# Patient Record
Sex: FEMALE | Race: WHITE
[De-identification: names, ages, dates, MRNs, and addresses within clinical notes are randomized per-mention and may not be internally consistent; named-entity substitution may affect disease eponyms.]

---

## 2018-04-02 ENCOUNTER — HOSPITAL ENCOUNTER (EMERGENCY)
Dept: HOSPITAL 68 - ERH | Age: 83
End: 2018-04-02
Payer: COMMERCIAL

## 2018-04-02 VITALS — HEIGHT: 61 IN | BODY MASS INDEX: 38.89 KG/M2 | WEIGHT: 206 LBS

## 2018-04-02 VITALS — DIASTOLIC BLOOD PRESSURE: 69 MMHG | SYSTOLIC BLOOD PRESSURE: 132 MMHG

## 2018-04-02 DIAGNOSIS — I10: ICD-10-CM

## 2018-04-02 DIAGNOSIS — E11.9: ICD-10-CM

## 2018-04-02 DIAGNOSIS — L03.115: Primary | ICD-10-CM

## 2018-04-02 DIAGNOSIS — R60.0: ICD-10-CM

## 2018-04-02 DIAGNOSIS — Z79.4: ICD-10-CM

## 2018-04-02 DIAGNOSIS — I50.9: ICD-10-CM

## 2018-04-02 LAB
ABSOLUTE GRANULOCYTE CT: 10.4 /CUMM (ref 1.4–6.5)
BASOPHILS # BLD: 0 /CUMM (ref 0–0.2)
BASOPHILS NFR BLD: 0.3 % (ref 0–2)
EOSINOPHIL # BLD: 0.2 /CUMM (ref 0–0.7)
EOSINOPHIL NFR BLD: 1.8 % (ref 0–5)
ERYTHROCYTE [DISTWIDTH] IN BLOOD BY AUTOMATED COUNT: 22.7 % (ref 11.5–14.5)
GRANULOCYTES NFR BLD: 83 % (ref 42.2–75.2)
HCT VFR BLD CALC: 30.8 % (ref 37–47)
LYMPHOCYTES # BLD: 1.1 /CUMM (ref 1.2–3.4)
MCH RBC QN AUTO: 24.7 PG (ref 27–31)
MCHC RBC AUTO-ENTMCNC: 32.7 G/DL (ref 33–37)
MCV RBC AUTO: 75.7 FL (ref 81–99)
MONOCYTES # BLD: 0.8 /CUMM (ref 0.1–0.6)
PLATELET # BLD: 319 /CUMM (ref 130–400)
PMV BLD AUTO: 7.1 FL (ref 7.4–10.4)
RED BLOOD CELL CT: 4.07 /CUMM (ref 4.2–5.4)
WBC # BLD AUTO: 12.5 /CUMM (ref 4.8–10.8)

## 2018-04-02 NOTE — RADIOLOGY REPORT
EXAMINATION:
CHEST 2 VIEWS
 
CLINICAL INFORMATION:
Edema. Crackles.
 
COMPARISON:
10/31/2017.
 
TECHNIQUE:
PA and lateral views of the chest were obtained.
 
FINDINGS:
The cardiac silhouette is enlarged, though stable. Intact midline sternal
wires are present. There is a stable degree of interstitial prominence
throughout both lungs. There are no discernible pleural effusions nor
pneumothoraces. There are no consolidations. The osseous structures are
stable.
 
IMPRESSION:
 
No consolidations.
 
Stable cardiomegaly with stable degree of mild vascular congestion.

## 2018-04-02 NOTE — ED GENERAL ADULT
History of Present Illness
 
General
Chief Complaint: Lower Extremity Problems
Stated Complaint: SENT BY  FOR POSSIBLE CELLULITIS
Source: patient, family, old records
Exam Limitations: no limitations
 
Vital Signs & Intake/Output
Vital Signs & Intake/Output
 Vital Signs
 
 
Date Time Temp Pulse Resp B/P B/P Pulse O2 O2 Flow FiO2
 
     Mean Ox Delivery Rate 
 
2050      97 Nasal  
 
       Cannula  
 
2048 97.9 96 18 132/69  97 Nasal 2.0L 
 
       Cannula  
 
1818 97.7 96 18 145/82  93 Room Air  
 
 
 
Allergies
Coded Allergies:
NO KNOWN ALLERGIES (13)
 
Reconcile Medications
Albuterol Sulfate 2.5 MG/3 ML (0.083 %) VIAL.NEB   1 Vial INH/SOL Q6-PRN PRN 
SHORTNESS OF BREATH  (Reported)
Amoxicillin/Potassium Clav (Augmentin 875-125 Tablet) 875 MG-125 MG TABLET   1 
TAB PO BID cellulitis 
Apixaban (Eliquis) 2.5 MG TABLET   2.5 MG PO BID ATRIAL FIBRILLATION
     .
Fluticasone/Salmeterol (Advair 250-50 Diskus) 250 MCG-50 MCG/DOSE BLST.W.DEV   1
PUF INH BID PRN SHORTNESS OF BREATH  (Reported)
Furosemide (Lasix) 40 MG TABLET   1 TAB PO DAILY WATER RETENTION  (Reported)
Gabapentin 300 MG CAPSULE   1 CAP PO QPM SLEEP  (Reported)
Insulin Detemir (Levemir Flextouch) 100 UNIT/ML (3 ML) INSULN.PEN   29 U SC 
DAILY DIABETES  (Reported)
Isosorbide Mononitrate (Isosorbide Mononitrate ER) 60 MG TAB.ER.24H   1 TAB PO 
DAILY HEART  (Reported)
Levothyroxine Sodium 100 MCG TABLET   1 TAB PO DAILY AC THYROID  (Reported)
Lorazepam 0.5 MG TABLET   1 TAB PO QPMP PRN SLEEP  (Reported)
Metoprolol Succinate 100 MG TAB.ER.24H   1 TAB PO DAILY HEART  (Reported)
Omeprazole 40 MG CAPSULE.DR   1 CAP PO QPM ACID REFLUX  (Reported)
Oxycodone HCl/Acetaminophen (Oxycodone-Acetaminophen 5-325) 5 MG-325 MG TABLET  
1 TAB PO BIDP PRN PAIN  (Reported)
Repaglinide (Prandin) 2 MG TABLET   1 TAB PO TID DIABETES
     .
Simvastatin (Simvastatin*) 40 MG TABLET   1 TAB PO DAILY CHOLESTEROL  (Reported)
 
Triage Note:
PT TO WITH DAUGHTER C/C RLE EDEMA AND REDDNESS X 1
DAY. +5 LB WEIGHT GAIN. DENIES CHEST PAIN. HX OF
SOB SECONDARY TO COPD, ON 2L NC AT BASELINE
Triage Nurses Notes Reviewed? yes
Onset: Abrupt
Duration: day(s): (2), constant, continues in ED
Timing: recent history
Injury Environment: home
Severity: mild, moderate
Severity Numbers: 5
No Modifying Factors: none
LMP (ages 10-50): post menopausal
Pregnant: No
Patient currently breastfeeds: No
HPI:
83-year-old female past medical history of COPD on 2 L home O2, CHF, atrial 
fibrillation, diabetes, hypothyroid, coronary artery disease presents for 
evaluation of pain swelling or redness in her right lower extremity as well as 
bilateral lower extremity edema right worse than left.  Patient reports she has 
gained 5 pounds over the past day.  She has a history of recurrent cellulitis in
the right lower extremity.  She reports that it is usually always a light pink 
color but over the past several days is become darker red.  There is also 
painful around the lower extremity calf and ankle.  No trauma.  She is able to 
walk with a walker.  No chest pain shortness of breath fever numbness tingling. 
No coughing no hemoptysis.  No recent surgery or trauma.  She has not required 
additional home oxygen.
(Rogelio Pandya)
 
Past History
 
Travel History
Traveled to [place] past 21 day No
 
Medical History
Any Pertinent Medical History? see below for history
Neurological: migraine (remote history), NEUROPATHY
EENT: NONE
Cardiovascular: AFIB, angina, CHF, hypertension, hyperlipidemia, "MULTIPLE MI'S
" QUAD BYPASS
Respiratory: COPD
Gastrointestinal: GERD
Hepatic: NONE
Renal: NONE
Musculoskeletal: NONE
Psychiatric: anxiety
Endocrine: diabetes, HYPOTHYROID
Blood Disorders: NONE
Cancer(s): NONE
History of MRSA: No
History of VRE: No
History of CDIFF: No
 
Surgical History
Surgical History: appendectomy, arthroscopy, CABG, cholecystectomy, hysterectomy
, thyroid lobectomy rotator cuff repair
 
Psychosocial History
Who do you live with Daughter
Services at Home None
What is your primary language English
Tobacco Use: Never used
 
Family History
Family History, If Any:
FATHER
  FH: CABG (coronary artery bypass surgery)
  FH: diabetes mellitus
  FH: HTN (hypertension)
  FH: kidney disease
  FH: stroke
MOTHER
  FH: myocardial infarction
BROTHER
  FH: heart disease
SISTER
  FH: COPD (chronic obstructive pulmonary disease)
  FH: heart disease
 
Hx Contributory? No
(Rogelio Pandya)
 
Review of Systems
 
Review of Systems
Constitutional:
Reports: no symptoms. 
EENTM:
Reports: no symptoms. 
Respiratory:
Reports: no symptoms. 
Cardiovascular:
Reports: peripheral edema. 
GI:
Reports: no symptoms. 
Genitourinary:
Reports: no symptoms. 
Musculoskeletal:
Reports: see HPI, joint pain, joint swelling, muscle pain, muscle stiffness. 
Skin:
Reports: erythema. 
Neurological/Psychological:
Reports: no symptoms. 
Hematologic/Endocrine:
Reports: no symptoms. 
Immunologic/Allergic:
Reports: no symptoms. 
All Other Systems: Reviewed and Negative
(Rogelio Pandya)
 
Physical Exam
 
Physical Exam
General Appearance: well developed/nourished, no apparent distress, alert, awake
, obese
Head: atraumatic, normal appearance
Eyes:
Bilateral: normal appearance, PERRL, EOMI. 
Ears, Nose, Throat: hearing grossly normal
Neck: normal inspection, supple, full range of motion, NO JVD
Respiratory: chest non-tender, no respiratory distress, quiet respiration, 
decreased breath sounds
Cardiovascular: regular rate/rhythm, normal peripheral pulses
Peripheral Pulses:
2+ radial (R), 2+ radial (L)
Gastrointestinal: soft, non-tender
Back: normal inspection, normal range of motion
Extremities: THERE IS BILATERAL 1+  LOWER EXTREMITY EDEMA RT WORSE THAN LEFT.  
tHERE IS ERYTHEMA PRESENT FROM THE RIGHT ANKLE EXTENDING TO THE MID RIGHT LOWER 
LEG.  tHIS AREA IS TENDER TO PALPATION AND WARM .  nO FOCAL FLUCTUANT AREAS OR 
DISCHARGE.  nO BRUISING.  sHE IS ABLE TO WALK AND BEAR WEIGHT.
Neurologic/Psych: no motor/sensory deficits, awake, alert, oriented x 3, normal 
gait (WITH WALKER )
Skin: intact, normal color, warm/dry
Lymphatic: no anterior cervical lucero
 
Core Measures
ACS in differential dx? No
CVA/TIA Diagnosis: No
Sepsis Present: No
Sepsis Focused Exam Completed? No
(Rogelio Pandya)
 
Progress
Differential Diagnoses
I considered the following diagnoses in my evaluation of the patient: [
Cellulitis, DVT, CHF exacerbation, COPD exacerbation, dependent edema, 
osteomyelitis, gout, osteoarthritis, sprain, fracture]
 
Plan of Care:
 Orders
 
 
Procedure Date/time Status
 
TROPONIN LEVEL 1819 Complete
 
LACTIC ACID 1819 Complete
 
COMPREHENSIVE METABOLIC PANEL 1819 Complete
 
CBC WITHOUT DIFFERENTIAL 1819 Complete
 
B-TYPE NATRIURETIC PEP (BNP) 1819 Complete
 
EKG 1819 Active
 
 
 Laboratory Tests
 
 
 
18:
Lactic Acid Cancelled
 
18 1843:
Anion Gap 12, Estimated GFR 33  L, BUN/Creatinine Ratio 20.7, Glucose 187  H, 
Lactic Acid 2.1, Calcium 8.8, Total Bilirubin 0.7, AST 15, ALT 19, Alkaline 
Phosphatase 93, Troponin I 0.03, Pro-B-Natriuretic Pept 1370  H, Total Protein 
7.0, Albumin 3.7, Globulin 3.3, Albumin/Globulin Ratio 1.1, CBC w Diff NO MAN 
DIFF REQ, RBC 4.07  L, MCV 75.7  L, MCH 24.7  L, MCHC 32.7  L, RDW 22.7  H, MPV 
7.1  L, Gran % 83.0  H, Lymphocytes % 8.9  L, Monocytes % 6.0, Eosinophils % 1.8
, Basophils % 0.3, Absolute Granulocytes 10.4  H, Absolute Lymphocytes 1.1  L, 
Absolute Monocytes 0.8  H, Absolute Eosinophils 0.2, Absolute Basophils 0
 
18:
Urine Color Cancelled, Urine Clarity Cancelled, Urine pH Cancelled, Ur Specific 
Gravity Cancelled, Urine Protein Cancelled, Urine Ketones Cancelled, Urine 
Nitrite Cancelled, Urine Bilirubin Cancelled, Urine Urobilinogen Cancelled, Ur 
Leukocyte Esterase Cancelled, Ur Microscopic Cancelled, Urine Hemoglobin 
Cancelled, Urine Glucose Cancelled
 
 
Patient seen and evaluated.  She is reporting pain swelling and redness in the 
right lower extremity.  She has a history of recurrent cellulitis in this area. 
She is afebrile no chest pain or shortness of breath.  She is satting in the mid
90s on her home O2.  Basic blood work chest x-ray ultrasound EKG obtained.
 
 white blood cell count of 12.5 thousand.  Negative lactic acid.  Remaining 
blood work is unchanged from previous.  Chest x-ray is negative for pulmonary 
edema.  Negative for DVT.  Trachea of the right ankle shows soft tissue swelling
without bony abnormality.  Negative troponin EKG shows A. fib rate controlled.  
Patient was ambulated in the emergency department and did not desaturate.  She 
has a steady gait with her walker.  She lives at home with her daughter and has 
a nurse that visits 3 times weekly.  Patient was medicated with Unasyn here.  
She'll be discharged home on Augmentin.  A line was made around the area of 
erythema to track spread.  Discussed return precautions in detail.  Follow-up 
with primary care doctor and cardiologist for a recheck in a few days return to 
the emergency department immediately if any concerns.  Patient agrees the plan. 
Case discussed with Dr. MORLEY he agrees.
Diagnostic Imaging:
Viewed by Me: Radiology Read.  Discussed w/RAD: Radiology Read. 
Radiology Impression: PATIENT: ENA THOMPSON  MEDICAL RECORD NO: [id number] PRESENT 
AGE: 83  PATIENT ACCOUNT NO: [id number] : 37  LOCATION: [facility] ORDERING 
PHYSICIAN: Rogelio IBARRA     SERVICE DATE:  EXAM TYPE: RAD - XRY-
ANKLE 3 OR MORE VIEWS R EXAMINATION: RIGHT ANKLE 3 VIEWS CLINICAL INFORMATION: 
Ankle pain and swelling. COMPARISON: None. TECHNIQUE: AP, lateral, oblique views
of the right ankle were obtained. FINDINGS: There are no fractures or 
dislocations. There is soft tissue swelling overlying the lateral malleolus. 
There is a small calcaneal spur at the Achilles insertion site. There is a well-
corticated ossicle inferior to the lateral malleolus. No ankle joint effusion is
identified. IMPRESSION: Soft tissue swelling without acute fracture. DICTATED BY
: James Rodriguez MD  DATE/TIME DICTATED:18 :RAD.MEZA
 DATE/TIME TRANSCRIBED:18 CONFIDENTIAL, DO NOT COPY WITHOUT 
APPROPRIATE AUTHORIZATION.  <Electronically signed in Other Vendor System>      
                                                                                
SIGNED BY: James Rodriguez MD 18 2
CXR Impression: PATIENT: ENA THOMPSON  MEDICAL RECORD NO: [id number] PRESENT AGE: 
83  PATIENT ACCOUNT NO: [id number] : 37  LOCATION: [facility] ORDERING PHYSICIAN:
Rogelio IBARRA     SERVICE DATE:  EXAM TYPE: RAD - XRY-CHEST XRAY, 
TWO VIEWS EXAMINATION: CHEST 2 VIEWS CLINICAL INFORMATION: Edema. Crackles. 
COMPARISON: 10/31/2017. TECHNIQUE: PA and lateral views of the chest were 
obtained. FINDINGS: The cardiac silhouette is enlarged, though stable. Intact 
midline sternal wires are present. There is a stable degree of interstitial 
prominence throughout both lungs. There are no discernible pleural effusions nor
pneumothoraces. There are no consolidations. The osseous structures are stable. 
IMPRESSION: No consolidations. Stable cardiomegaly with stable degree of mild 
vascular congestion. DICTATED BY: James Rodriguez MD  DATE/TIME DICTATED:18 :RAD.MEZA  DATE/TIME TRANSCRIBED:18 
CONFIDENTIAL, DO NOT COPY WITHOUT APPROPRIATE AUTHORIZATION.  <Electronically 
signed in Other Vendor System>                                                  
                                     SIGNED BY: James Rodriguez MD 18
Initial ED EKG: AFIB (RATE 117)
(Rogelio Pandya)
 
Departure
 
Departure
Disposition: HOME OR SELF CARE
Condition: Stable
Clinical Impression
Primary Impression: Cellulitis
Qualifiers:  Site of cellulitis: extremity Site of cellulitis of extremity: 
lower extremity Laterality: right Qualified Code: L03.115 - Cellulitis of right 
lower limb
Referrals:
Rell CALDERON,Marty QUEEN (PCP/Family)
 
Additional Instructions:
Take antibiotics as directed for the full course.  Continue all medications as 
directed.  Keep the legs elevated at night follow-up with her primary care 
doctor and cardiologist.  Monitor symptoms.  If you have worsening swelling, 
chest pain, shortness of breath, fever, spreading redness or any other concerns 
return immediately.
Departure Forms:
Customer Survey
General Discharge Information
Prescriptions:
Current Visit Scripts
Amoxicillin/Potassium Clav (Augmentin 875-125 Tablet) 1 TAB PO BID  
     #20 TAB 
 
 
(Rogelio Pandya)
 
PA/NP Co-Sign Statement
Statement:
ED Attending supervision documentation-
 
[X] I saw and evaluated the patient. I have also reviewed all the pertinent lab 
results and diagnostic results. I agree with the findings and the plan of care 
as documented in the PA's/NP's documentation. 
 
[X] I have reviewed the ED Record and agree with the PA's/NP's documentation.
 
[] Additions or exceptions (if any) to the PAs/NP's note and plan are 
summarized below:
[]
 
(Michelle CALDERON,Sathish MEJIA)
 
Critical Care Note
 
Critical Care Note
Critical Care Time: non-applicable
(Black PA,Rogelio)

## 2018-04-02 NOTE — RADIOLOGY REPORT
EXAMINATION:
RIGHT ANKLE 3 VIEWS
 
CLINICAL INFORMATION:
Ankle pain and swelling.
 
COMPARISON:
None.
 
TECHNIQUE:
AP, lateral, oblique views of the right ankle were obtained.
 
FINDINGS:
There are no fractures or dislocations. There is soft tissue swelling
overlying the lateral malleolus. There is a small calcaneal spur at the
Achilles insertion site. There is a well-corticated ossicle inferior to the
lateral malleolus. No ankle joint effusion is identified.
 
IMPRESSION:
 
Soft tissue swelling without acute fracture.

## 2018-04-02 NOTE — ULTRASOUND REPORT
EXAMINATION:
US TRIPLEX LOWER EXTREMITY, RIGHT
 
CLINICAL INFORMATION:
Right lower extremity pain and edema.
 
COMPARISON:
None
 
TECHNIQUE:
Color-flow triplex imaging with spectral analysis and compression Doppler
were performed on the lower extremity.
 
FINDINGS:
Respiratory variation, normal compression and augmented flow are noted
throughout the lower extremity. The visualized common femoral vein,
superficial femoral vein, profunda femoral vein, popliteal vein and midcalf
peroneal and posterior tibial venous segments show no evidence of deep venous
thrombosis.
 
There is no Baker's cyst.
 
IMPRESSION:
Normal triplex scan without evidence of deep venous thrombosis involving the
lower extremity.

## 2018-04-06 ENCOUNTER — HOSPITAL ENCOUNTER (INPATIENT)
Dept: HOSPITAL 68 - ERH | Age: 83
LOS: 4 days | Discharge: HOME HEALTH | DRG: 291 | End: 2018-04-10
Attending: INTERNAL MEDICINE | Admitting: INTERNAL MEDICINE
Payer: COMMERCIAL

## 2018-04-06 VITALS — WEIGHT: 187.25 LBS | BODY MASS INDEX: 35.35 KG/M2 | HEIGHT: 61 IN

## 2018-04-06 VITALS — SYSTOLIC BLOOD PRESSURE: 138 MMHG | DIASTOLIC BLOOD PRESSURE: 70 MMHG

## 2018-04-06 DIAGNOSIS — Z95.5: ICD-10-CM

## 2018-04-06 DIAGNOSIS — Z82.49: ICD-10-CM

## 2018-04-06 DIAGNOSIS — I25.10: ICD-10-CM

## 2018-04-06 DIAGNOSIS — I87.2: ICD-10-CM

## 2018-04-06 DIAGNOSIS — I13.0: Primary | ICD-10-CM

## 2018-04-06 DIAGNOSIS — L03.115: ICD-10-CM

## 2018-04-06 DIAGNOSIS — Z85.828: ICD-10-CM

## 2018-04-06 DIAGNOSIS — E66.01: ICD-10-CM

## 2018-04-06 DIAGNOSIS — K21.9: ICD-10-CM

## 2018-04-06 DIAGNOSIS — Z99.81: ICD-10-CM

## 2018-04-06 DIAGNOSIS — Z95.1: ICD-10-CM

## 2018-04-06 DIAGNOSIS — I48.2: ICD-10-CM

## 2018-04-06 DIAGNOSIS — Z79.4: ICD-10-CM

## 2018-04-06 DIAGNOSIS — I73.9: ICD-10-CM

## 2018-04-06 DIAGNOSIS — J44.9: ICD-10-CM

## 2018-04-06 DIAGNOSIS — R09.02: ICD-10-CM

## 2018-04-06 DIAGNOSIS — Z90.710: ICD-10-CM

## 2018-04-06 DIAGNOSIS — F41.9: ICD-10-CM

## 2018-04-06 DIAGNOSIS — E11.40: ICD-10-CM

## 2018-04-06 DIAGNOSIS — G47.33: ICD-10-CM

## 2018-04-06 DIAGNOSIS — Z79.01: ICD-10-CM

## 2018-04-06 DIAGNOSIS — I50.33: ICD-10-CM

## 2018-04-06 DIAGNOSIS — Z79.891: ICD-10-CM

## 2018-04-06 DIAGNOSIS — E89.0: ICD-10-CM

## 2018-04-06 DIAGNOSIS — Z90.49: ICD-10-CM

## 2018-04-06 DIAGNOSIS — G43.909: ICD-10-CM

## 2018-04-06 DIAGNOSIS — N18.9: ICD-10-CM

## 2018-04-06 DIAGNOSIS — E78.5: ICD-10-CM

## 2018-04-06 DIAGNOSIS — Z79.51: ICD-10-CM

## 2018-04-06 LAB
ABSOLUTE GRANULOCYTE CT: 9.6 /CUMM (ref 1.4–6.5)
BASOPHILS # BLD: 0 /CUMM (ref 0–0.2)
BASOPHILS NFR BLD: 0.1 % (ref 0–2)
EOSINOPHIL # BLD: 0.2 /CUMM (ref 0–0.7)
EOSINOPHIL NFR BLD: 1.3 % (ref 0–5)
ERYTHROCYTE [DISTWIDTH] IN BLOOD BY AUTOMATED COUNT: 23.5 % (ref 11.5–14.5)
GRANULOCYTES NFR BLD: 84 % (ref 42.2–75.2)
HCT VFR BLD CALC: 29.5 % (ref 37–47)
LYMPHOCYTES # BLD: 1 /CUMM (ref 1.2–3.4)
MCH RBC QN AUTO: 23.4 PG (ref 27–31)
MCHC RBC AUTO-ENTMCNC: 30.7 G/DL (ref 33–37)
MCV RBC AUTO: 76.4 FL (ref 81–99)
MONOCYTES # BLD: 0.7 /CUMM (ref 0.1–0.6)
PLATELET # BLD: 283 /CUMM (ref 130–400)
PMV BLD AUTO: 6.8 FL (ref 7.4–10.4)
RED BLOOD CELL CT: 3.86 /CUMM (ref 4.2–5.4)
WBC # BLD AUTO: 11.4 /CUMM (ref 4.8–10.8)

## 2018-04-06 NOTE — RADIOLOGY REPORT
EXAMINATION:
XR CHEST
 
CLINICAL INFORMATION:
Increased fluid retention. Shortness of breath.
 
COMPARISON:
04/02/2018
 
TECHNIQUE:
2 views of the chest were obtained.
 
FINDINGS:
Surgical clips overlie the mediastinum. Median sternotomy wires appear
intact. The lungs are well expanded. Central vascular prominence with mild
interstitial prominence. No pleural effusion or pneumothorax. No dense
consolidation. The cardiac silhouette is prominent. There is a calcified
aorta.
 
IMPRESSION:
Findings suggestive of mild fluid overload. No pleural effusion or
consolidation.

## 2018-04-06 NOTE — ED GENERAL ADULT
History of Present Illness
 
General
Chief Complaint: General Adult
Stated Complaint: "BACK PAIN, GAIN 9LBS OF FLUID IN 1 WEEK"
Source: patient, family
Exam Limitations: no limitations
 
Vital Signs & Intake/Output
Vital Signs & Intake/Output
 Vital Signs
 
 
Date Time Temp Pulse Resp B/P B/P Pulse O2 O2 Flow FiO2
 
     Mean Ox Delivery Rate 
 
04/06 2132       Nasal 3.0L 
 
       Cannula  
 
04/06 1906  101 20 114/64  96 Nasal 2.0L 
 
       Cannula  
 
04/06 1730 97.1 106 22 124/71  95 Nasal 2.0L 
 
       Cannula  
 
 
 
Allergies
Coded Allergies:
NO KNOWN ALLERGIES (11/26/13)
 
Reconcile Medications
Albuterol Sulfate 2.5 MG/3 ML (0.083 %) VIAL.NEB   1 Vial INH/SOL Q6-PRN PRN 
SHORTNESS OF BREATH  (Reported)
Amoxicillin/Potassium Clav (Augmentin 875-125 Tablet) 875 MG-125 MG TABLET   1 
TAB PO BID cellulitis 
Apixaban (Eliquis) 2.5 MG TABLET   2.5 MG PO BID ATRIAL FIBRILLATION
     .
Fluticasone/Salmeterol (Advair 250-50 Diskus) 250 MCG-50 MCG/DOSE BLST.W.DEV   1
PUF INH BID PRN SHORTNESS OF BREATH  (Reported)
Furosemide (Lasix) 40 MG TABLET   1 TAB PO DAILY WATER RETENTION  (Reported)
Gabapentin 300 MG CAPSULE   1 CAP PO QPM SLEEP  (Reported)
Insulin Detemir (Levemir Flextouch) 100 UNIT/ML (3 ML) INSULN.PEN   29 U SC 
DAILY DIABETES  (Reported)
Isosorbide Mononitrate (Isosorbide Mononitrate ER) 60 MG TAB.ER.24H   1 TAB PO 
DAILY HEART  (Reported)
Levothyroxine Sodium 100 MCG TABLET   1 TAB PO DAILY AC THYROID  (Reported)
Lorazepam 0.5 MG TABLET   1 TAB PO QPMP PRN SLEEP  (Reported)
Metoprolol Succinate 100 MG TAB.ER.24H   1 TAB PO DAILY HEART  (Reported)
Omeprazole 40 MG CAPSULE.DR   1 CAP PO QPM ACID REFLUX  (Reported)
Oxycodone HCl/Acetaminophen (Oxycodone-Acetaminophen 5-325) 5 MG-325 MG TABLET  
1 TAB PO BIDP PRN PAIN  (Reported)
Repaglinide (Prandin) 2 MG TABLET   1 TAB PO TID DIABETES
     .
Simvastatin (Simvastatin*) 40 MG TABLET   1 TAB PO DAILY CHOLESTEROL  (Reported)
 
Triage Note:
83 YEAR OLD FEMALE HISTORY OF CHF COMES TO ER DUE
 TO 5-10 LB WEIGHT GAIN SINCE MONDAY, PT IS O2
 DEPENDENT 2L VIA NC AND O2 SAT 95 %, DENIES
 INCREASED SOB. PT IS BEING TREATED WITH PO ABT FOR
 RLE CELLULITIS AND STATES THAT IT LOOKS BETTER. PT
 NOTED BLE EDEMA, ALSO COMPLAINS OF 10/10 BACK PAIN
 WHICH IS CHRONIC AND HER PMD INCREASED HER
 PERCOCET DOSE TODAY. PT TAKES 40 MG PO LASIX BID
 AND IT IS NOT HELPING
Triage Nurses Notes Reviewed? yes
Onset: Gradual
Duration: day(s):
Timing: recent history
Injury Environment: home
Severity: moderate
Modifying Factors:
Improves With: rest. 
Associated Symptoms: swelling of lower extremities, weight gain
HPI:
82 yo woman
presents with 3 weeks of increasing lower extremity swelling, 9 lb weight gain 
over the past week, diminished urination.
 
She was seen 5 days ago in ED for cellulitis which has improved, "but both her 
legs are still swollen."
 
Her PMD recently adjusted her lasix.
 
She is otherwise well. 
 
Past History
 
Travel History
Traveled to [place] past 21 day No
 
Medical History
Any Pertinent Medical History? see below for history
Neurological: migraine (remote history), NEUROPATHY
EENT: NONE
Cardiovascular: AFIB, angina, CHF, hypertension, hyperlipidemia, "MULTIPLE MI'S
" QUAD BYPASS
Respiratory: COPD
Gastrointestinal: GERD
Hepatic: NONE
Renal: NONE
Musculoskeletal: NONE
Psychiatric: anxiety
Endocrine: diabetes, HYPOTHYROID
Blood Disorders: NONE
Cancer(s): NONE
History of MRSA: No
History of VRE: No
History of CDIFF: No
 
Surgical History
Surgical History: appendectomy, arthroscopy, CABG, cholecystectomy, hysterectomy
, thyroid lobectomy rotator cuff repair
 
Psychosocial History
Who do you live with Daughter
Services at Home None
What is your primary language English
Tobacco Use: Never used
ETOH Use: denies use
Illicit Drug Use: denies illicit drug use
 
Family History
Family History, If Any:
FATHER
  FH: CABG (coronary artery bypass surgery)
  FH: diabetes mellitus
  FH: HTN (hypertension)
  FH: kidney disease
  FH: stroke
MOTHER
  FH: myocardial infarction
BROTHER
  FH: heart disease
SISTER
  FH: COPD (chronic obstructive pulmonary disease)
  FH: heart disease
 
Hx Contributory? No
 
Review of Systems
 
Review of Systems
Constitutional:
Reports: no symptoms. 
EENTM:
Reports: no symptoms. 
Respiratory:
Reports: no symptoms. 
Cardiovascular:
Reports: no symptoms. 
GI:
Reports: no symptoms. 
Genitourinary:
Reports: no symptoms. 
Musculoskeletal:
Reports: no symptoms. 
Skin:
Reports: no symptoms. 
Neurological/Psychological:
Reports: no symptoms. 
Hematologic/Endocrine:
Reports: no symptoms. 
Immunologic/Allergic:
Reports: no symptoms. 
All Other Systems: Reviewed and Negative
 
Physical Exam
 
Physical Exam
General Appearance: well developed/nourished, mild distress
Head: atraumatic, normal appearance
Eyes:
Bilateral: normal appearance. 
Ears, Nose, Throat: normal pharynx, normal ENT inspection
Neck: normal inspection, supple, full range of motion
Respiratory: normal breath sounds, chest non-tender, diminished breath sounds at
bases
Cardiovascular: regular rate/rhythm
Gastrointestinal: normal bowel sounds, soft, non-tender
Back: normal inspection, normal range of motion
Extremities: normal inspection, normal capillary refill, normal range of motion,
no edema
Neurologic/Psych: no motor/sensory deficits, awake, alert, oriented x 3
Skin: intact, normal color
 
Core Measures
ACS in differential dx? No
CVA/TIA Diagnosis: No
Sepsis Present: No
Sepsis Focused Exam Completed? No
 
Progress
Differential Diagnoses
I considered the following diagnoses in my evaluation of the patient: 
chf vs pneumonia vs other. 
 
Plan of Care:
 Orders
 
 
Procedure Date/time Status
 
Nothing by Mouth 04/07 B Active
 
Lab Add-on Test 04/06 2214 Active
 
Patient Data 04/06 2159 Active
 
Saline Lock 04/06 2134 Active
 
Misc Message 04/06 2134 Active
 
ED Holding Orders 04/06 2134 Active
 
Admit to inpatient 04/06 2134 Active
 
Vital Signs 04/06 2134 Active
 
Code Status 04/06 2134 Active
 
EKG 04/06 2006 Active
 
TSH REFLEX 04/06 1740 Active
 
B-TYPE NATRIURETIC PEP (BNP) 04/06 1740 Active
 
TROPONIN LEVEL 04/06 1735 Active
 
COMPREHENSIVE METABOLIC PANEL 04/06 1735 Active
 
CBC WITHOUT DIFFERENTIAL 04/06 1735 Complete
 
 
 Laboratory Tests
 
 
 
04/06/18 1740:
Anion Gap 13, Estimated GFR 39  L, BUN/Creatinine Ratio 21.5, Glucose 246  H, 
Calcium 8.7, Total Bilirubin 0.7, AST 17, ALT 22, Alkaline Phosphatase 91, 
Troponin I 0.02, Pro-B-Natriuretic Pept Pending, Total Protein 6.7, Albumin 3.5,
Globulin 3.2, Albumin/Globulin Ratio 1.1, TSH &T3 &Free T4 Intrp Pending, CBC w 
Diff NO MAN DIFF REQ, RBC 3.86  L, MCV 76.4  L, MCH 23.4  L, MCHC 30.7  L, RDW 
23.5  H, MPV 6.8  L, Gran % 84.0  H, Lymphocytes % 8.7  L, Monocytes % 5.9, 
Eosinophils % 1.3, Basophils % 0.1, Absolute Granulocytes 9.6  H, Absolute 
Lymphocytes 1.0  L, Absolute Monocytes 0.7  H, Absolute Eosinophils 0.2, 
Absolute Basophils 0
 
Initial ED EKG: AFIB, no acute changes
 
Departure
 
Departure
Disposition: HOME OR SELF CARE
Condition: Stable
Clinical Impression
Primary Impression: CHF (congestive heart failure)
Referrals:
Rell CALDERON,Marty QUEEN (PCP/Family)
 
Departure Forms:
Customer Survey
General Discharge Information
 
Admission Note
Spoke With:
Alice Hsu MD
Documentation of Exam:
Documentation of any treatments & extenuating circumstances including Concerns 
Regarding Discharge (functional status, medication knowledge or non-compliance, 
living conditions, etc.) that warrant an admission rather than observation: 
 
pt with chf w/ dyspnea and increased 02 requirement (2l nc to 3l nc).... with 9 
lb weight gain over the past week.... She merits iv last, 02 support, cards 
eval.
 
call placed to cards. 
 
 
Critical Care Note
 
Critical Care Note
Critical Care Time: non-applicable

## 2018-04-06 NOTE — HISTORY & PHYSICAL
Awadalla MD,Kathleen 04/06/18 2200:
General Information and [facility]
MD Statement:
I have seen and personally examined ENA THOMPSON and documented this H&P.
 
The patient is a 83 year old F who presented with a patient stated chief 
complaint of [Weight gain due to fluid overload- CHF exacerbation].
 
Source of Information: patient, old records
Exam Limitations: no limitations
History of Present Illness:
82-year-old woman with past history significant for coronary artery disease 
status post CABG in 2001 and stent placement in 2013, hypertension, A. fib on 
Xarelto, COPD on 2 L of home oxygen history of skin cancer brought in by by 
daughter for evaluation of weight gain of 9 pounds since last Monday.  Patient 
mentioned that she weigh herself daily and she noticed that she gained 5 pounds 
from Easter Sunday to Monday in the morning in addition to total gain of 4 
pounds over the past 4 days.  She also noticed a decreased urination.  In 
addition she complains of shortness of breath when she walks from her recliner 
to the bathroom, she had to increase her nasal oxygen from 2 L to 3 L to 
overcome this shortness of breath.  The patient noticed that her legs are more 
swollen than usual especially at the right ankle in addition to increased 
abdominal girth.  The patient is compliant with her home meds including 
furosemide however she has to get her BEP checked regularly for PJ and to dose 
she also complains of occasional dry cough.  She she cannot lie flat at baseline
because she feels short of breath versus orthopnea, she denies paroxysmal 
nocturnal dyspnea.  She also denies any fever, nausea, vomiting, diarrhea or 
constipation
Patient complains of chronic back pain for which she has to take Percocet 1 tab 
every 6 as needed, it was increased today by her PCP to 2 tabs every 6 as 
needed.
She lives at home with her daughter and granddaughter, uses walker for 
ambulation, denies any recent dietary changes or changes to her medications.  
She also denies recent travel or sick contacts.
In addition the patient reports that her blood sugar was not well controlled 
this morning her fingerstick glucose was 141.  She also complains of obstructive
sleep apnea for which she uses CPAP at night.
 
She was admitted back in August 2017 for CHF exacerbation, UTI, A. fib, DM
Her last echo in 8/17 showed ejection fraction more than 55%, left ventricular 
mild wall thickness, mild reduction of right ventricular systolic function, 
right ventricular systolic pressure was more than 60 mmHg
 
ED course:
Vital signs on admission: Blood pressure 124/71, temperature 97.1, pulse 106, 
respiratory 22, pulse ox 95 on 2 L nasal cannula
Labs on admission: WBC 11.4, hemoglobin 9.1, hematocrit 29.5, platelet 283, 
sodium 136, chloride 93, BUN 28, creatinine 1.3, glucose 246, AST 17, ALT 22,
Chest x-ray showed mild fluid overload, no pleural effusion or consolidation
 
 
Allergies/Medications
Allergies:
Coded Allergies:
NO KNOWN ALLERGIES (11/26/13)
 
Home Med list
Albuterol Sulfate 2.5 MG/3 ML (0.083 %) VIAL.NEB   1 Vial INH/SOL Q6-PRN PRN 
SHORTNESS OF BREATH  (Reported)
Amoxicillin/Potassium Clav (Augmentin 875-125 Tablet) 875 MG-125 MG TABLET   1 
TAB PO BID cellulitis 
Apixaban (Eliquis) 2.5 MG TABLET   2.5 MG PO BID ATRIAL FIBRILLATION
     .
Fluticasone/Salmeterol (Advair 250-50 Diskus) 250 MCG-50 MCG/DOSE BLST.W.DEV   1
PUF INH BID PRN SHORTNESS OF BREATH  (Reported)
Furosemide (Lasix) 40 MG TABLET   1 TAB PO DAILY WATER RETENTION  (Reported)
Gabapentin 300 MG CAPSULE   1 CAP PO QPM SLEEP  (Reported)
Insulin Detemir (Levemir Flextouch) 100 UNIT/ML (3 ML) INSULN.PEN   34 U SC 
DAILY DIABETES  (Reported)
Isosorbide Mononitrate (Isosorbide Mononitrate ER) 60 MG TAB.ER.24H   1 TAB PO 
DAILY HEART  (Reported)
Levothyroxine Sodium 100 MCG TABLET   1 TAB PO DAILY AC THYROID  (Reported)
Lorazepam 0.5 MG TABLET   1 TAB PO QPMP PRN SLEEP  (Reported)
Metoprolol Succinate 100 MG TAB.ER.24H   1 TAB PO DAILY HEART  (Reported)
Omeprazole 40 MG CAPSULE.DR   1 CAP PO QPM ACID REFLUX  (Reported)
Oxycodone HCl/Acetaminophen (Oxycodone-Acetaminophen 5-325) 5 MG-325 MG TABLET  
1 TAB PO BIDP PRN PAIN  (Reported)
Repaglinide (Prandin) 2 MG TABLET   1 TAB PO TID DIABETES
     .
Simvastatin (Simvastatin*) 40 MG TABLET   1 TAB PO DAILY CHOLESTEROL  (Reported)
 
 
Past History
 
Travel History
Traveled to [place] past 21 day No
 
Medical History
Neurological: migraine (remote history), NEUROPATHY
EENT: NONE
Cardiovascular: AFIB, angina, CHF, hypertension, hyperlipidemia, "MULTIPLE MI'S
" QUAD BYPASS
Respiratory: COPD
Gastrointestinal: GERD
Hepatic: NONE
Renal: NONE
Musculoskeletal: NONE
Psychiatric: anxiety
Endocrine: diabetes, HYPOTHYROID
Blood Disorders: NONE
Cancer(s): NONE
History of MRSA: No
History of VRE: No
History of CDIFF: No
 
Surgical History
Surgical History: appendectomy, arthroscopy, CABG, cholecystectomy, hysterectomy
, thyroid lobectomy rotator cuff repair
 
Past Family/Social History
 
Family History
Relations & Conditions if any
FATHER
  FH: CABG (coronary artery bypass surgery)
  FH: diabetes mellitus
  FH: HTN (hypertension)
  FH: kidney disease
  FH: stroke
MOTHER
  FH: myocardial infarction
BROTHER
  FH: heart disease
SISTER
  FH: COPD (chronic obstructive pulmonary disease)
  FH: heart disease
 
 
Psychosocial History
Services at Home: None
ETOH Use: denies use
Illicit Drug Use: denies illicit drug use
 
Review of Systems
 
Review of Systems
Constitutional:
Denies: no symptoms. 
Cardiovascular:
Reports: edema, orthopena.  Denies: palpitations, peripheral edema. 
Respiratory:
Reports: cough, orthopnea, short of breath.  Denies: sputum production, stridor,
wheezing. 
GI:
Denies: no symptoms. 
Genitourinary:
Denies: no symptoms. 
Musculoskeletal:
Denies: no symptoms. 
Skin:
Denies: no symptoms. 
Neurological/Psychological:
Denies: no symptoms. 
Hematologic/Endocrine:
Denies: no symptoms. 
 
Exam & Diagnostic Data
Last 24 Hrs of Vital Signs/I&O
 Vital Signs
 
 
Date Time Temp Pulse Resp B/P B/P Pulse O2 O2 Flow FiO2
 
     Mean Ox Delivery Rate 
 
04/06 2350      93 Nasal 2.0L 
 
       Cannula  
 
04/06 2336 98.3 93 18 138/70  95 Nasal 2.0L 
 
       Cannula  
 
04/06 2222 97.8 98 20 130/72  94 Nasal 2.0L 
 
       Cannula  
 
04/06 2132       Nasal 3.0L 
 
       Cannula  
 
04/06 1906  101 20 114/64  96 Nasal 2.0L 
 
       Cannula  
 
04/06 1730 97.1 106 22 124/71  95 Nasal 2.0L 
 
       Cannula  
 
 
 Intake & Output
 
 
 04/07 0800 04/07 0000 04/06 1600
 
Intake Total   
 
Output Total   
 
Balance   
 
    
 
Patient  208 lb 
 
Weight   
 
Weight  Reported by Patient 
 
Measurement   
 
Method   
 
 
 
 
Physical Exam
General Appearance Alert, Oriented X3, Cooperative, No Acute Distress
HEENT Atraumatic, PERRLA, EOMI, Mucous Membr. moist/pink
Neck Supple
Cardiovascular Normal S1, Normal S2
Lungs Normal Air Movement, minimal widespreas wheezes
Abdomen Normal Bowel Sounds, Soft, abd distension
Neurological Normal Speech, Strength at 5/5 X4 Ext, Normal Tone, Sensation 
Intact, Cranial Nerves 3-12 NL
Extremities bilateral 2 + pitting edema, right LE erythema is receeding from the
line marking done in the ED previously to monitor cellulitis
Vascular Normal Pulses
 
Assessment/Plan
Assessment:
82-year-old woman with past history significant for coronary artery disease 
status post CABG in 2001 and stent placement in 2013, hypertension, A. fib on 
Eliquis, COPD on 2 L of home oxygen history of skin cancer brought in by by 
daughter for evaluation of weight gain of 9 pounds since last Monday.  Most 
likely her symptoms is due to CHF exacerbation.  Given her CKD the patient might
not be receiving the optimal dose of Lasix.  In addition she was recently 
diagnosed with cellulitis which it to her right lower extremity swelling.  The 
patient is currently on Eliquis which decreased her risk of having DVT
 
Problem list:
CHF exacerbation
Diabetes mellitus
Hypertension
A. fib on Eliquis
COPD and oxygen
CKD
Chronic back pain
Right lower extremity cellulitis
 
Plan:
Admit to telemetry floor
IV Lasix 40 mg daily
Close monitoring of BEP
Atorvastatin 40 mg daily
Continue her metoprolol dose in the morning and restart
Cardiology consult appreciated in the a.m.
Fingerstick glucose
Levemir 12 units twice daily
Continue ELiquis 2.5 mg twice daily
Strict I's and O's
Vitals every shift
Augmentin 500 mg p.o. twice daily for a total of 10 days
Percocet 1 tab every 6 as needed for back pain
Continue home meds
 
Full code
DVT prophylaxis with Eliquis
Heart healthy diet
 
 
 
 
 
 
 
 
 
As Ranked By This Provider
Problem List:
 1. Cellulitis
 
 2. CHF (congestive heart failure)
 
 3. Diabetes
 
 4. Afib
 
 
Core Measures/Misc (9/17)
 
Acute Coronary Syndrome
ACS Diagnosis: No
 
Congestive Heart Failure
Congestive Heart Failure Diagnosis Yes
Last Known EF % 55
 
Cerebrovascular Accident
CVA/TIA Diagnosis: No
 
VTE (View Protocol)
VTE Risk Factors Age>40
No Mechanical VTE Prophylaxis d/t N/A MechProphylax Ordered
No VTE Pharm Prophylaxis d/t NA PharmProphylax ordered
 
Sepsis (View protocol)
Sepsis Present: No
 
 
Shadi,Aartee 04/07/18 0520:
Attending MD Review Statement
 
Attending Statement
Attending MD Statement: examined this patient, discuss w/resident/PA/NP, agreed 
w/resident/PA/NP, reviewed EMR data (avail), reviewed images, amended to note
Attending Assessment/Plan:
 
CC: Weight gain
PMH: A. fib, COPD on 2 L nasal cannula, heart failure with preserved ejection 
fraction, CAD S/P CABG and PCI, pulmonary hypertension, DM, history of thyroid 
tumor S/P radio ablation, now hypothyroidism, chronic back pain, CKD 
 
Patient came to ER for weight gain, 9 pounds in last 1 week. Patient weighs 
herself daily because of her heart failure. Patient had history of acute kidney 
injury, gets labs every week and her Lasix dose is adjusted according to her 
weight and kidney function. She noticed this 9 pound weight gain over one week 
duration, associated with increased leg swelling, increased abdominal girth, 
decreased urine output, dyspnea on exertion. She has chronic dry cough which is 
unchanged, no sputum production, she tried to increase her oxygen at home to 3 L
for her shortness of breath with the symptomatic relief. Because of the 
persistence of the symptoms she came to ER for further evaluation. She was 
recently seen in ER on April 2, was found to have cellulitis and currently on 
antibiotics. Right lower extremity redness and and tenderness is improved as 
compared to that visit. Other than that mentioned symptoms complete ROS 
unremarkable.
 
Vitals: Afebrile, pulse 106, RR 22, blood pressure 124/71, saturating 95% on 2 L
nasal cannula
On exam: A O 3, cooperative, no acute distress, neck supple, JVD difficult to 
assess, no lymphadenopathy, mucosa moist, complete neuro exam unremarkable, +2 
leg edema right more than left, scar of previous vein grafting on the right side
, very minimum inflammation on the leg and right side improving compared to the 
marking done earlier CVS: S1-S2, irregular. RS: Bibasilar fine crackles, minimum
expiratory wheezing. Abdomen: Soft, NT, distended, fluid thrill present, bowel 
sounds present. Peripheral pulses perfusion normal. mild tenderness on right 
side lower back
 
CXR: Findings suggestive of mild fluid overload. No pleural effusion or
consolidation.
 
Assessment and plan
 
83 year old female with extensive past medical history presented in ER for 9 
pound weight gain over last 1 week, increased leg swelling, increased abdominal 
girth, increasing oxygen requirement at home secondary to dyspnea on exertion. 
Denies any obvious wheezing, has dry cough without expectoration, no fever or 
chills, nausea or vomiting. She is on antibiotics for recently diagnosed 
cellulitis and right lower extremity which is improving on examination. JVD 
could not be assessed as she cannot lay down from recliner, minimum bibasilar 
crackles and wheezing, +2 pitting edema present, abdomen is distended and fluid 
thrill present. Chest x-ray confirms the finding of mild fluid overload. Patient
's WBC is improving as compared to previous ER visit, otherwise labs 
unremarkable. Patient's creatinine is much better as compared to past. She 
appears to have acute on chronic heart failure, needing hospitalization for IV 
diuretic. 
 
+ Acute on chronic heart failure with preserved ejection fraction
+ Right leg Cellulitis under treatment
+ Hx of A. fib, COPD on 2 L nasal cannula, CAD S/P CABG and PCI, pulmonary 
hypertension, DM, hypothyroidism, chronic back pain, CKD 
 
- Admit to telemetry
- Continuous telemetry monitoring
- Continue O2 by nasal cannula, try to wean to her home level
- IV Lasix 40 mg repeat in a.m., reassess for volume status
- Strict I's and O's
- Daily weights
- Serial troponin and EKGs
- 2-D echo in a.m. if suggested by cardiology
- Cardiology consult in a.m.
- DVT prophylaxis 
- OT PT evaluation
- Continue by mouth Augmentin for right lower extremity cellulitis
- Continue sliding scale insulin, hold other antidiabetic
- Continue rest of her home medications after confirming with her daughter in 
morning. Patient could not confirm all her medications.
 
 
 
 
Katie Ross 04/07/18 0921:
Resident Review Statement
Resident Statement: examined this patient, discussed with intern, amended to 
note
Other Findings:
 
 
Ms Thompson is an 83-year-old woman with past medical history of atrial 
fibrillation on anticoagulation with DOAC, coronary artery disease with prior 
CABG and subsequent PCI in July of 2013, chronic heart failure with preserved 
ejection fraction, pulmonary hypertension, hypertension, hyperlipidemia, COPD on
2 L oxygen, migraine, type 2 diabetes, hypothyroidism, anxiety came to the 
hospital with a chief concern of recent weight gain of approximately 5-10 pounds
in the last 1 week, worsening pedal edema, back pain.  Reported sleeping only on
her recliner.  Occasional cough.  Also reported decreased urination in the last 
3 weeks, and worsening abdominal distention.  She has been treated for 
cellulitis with amoxicillin, start date 04/03/2018. 
 
At the time of admission-vitals temperature 97.1, pulse rate 106, respiration 22
, blood pressure 124/71, pulse ox 95% on 2 L.
 
General Exam: AAOx3, No acute distress, Skin: No rashes, no breakdown;HEENT: 
PERRLA, EOMI;Neck: Supple, No JVD, No cervical lymphadenopathy;CVS: Reg Rate, 
Normal S1,S2, No MGR;Resp: Decreased air entry, Rales present.  Abdomen: Soft, 
No tenderness, Normal Bowel Sounds;Neuro: Normal Speech, Strength 5/5 b/l x 4 
extremities, Sensation intact, CN III-XII NL, Reflexes 2+;Extremities: No 
cyanosis, 3+ pedal edema, erythema on the right lower extremity extending from 
the ankle up to middle of her leg, although receding
 
Pertinent lab findings-WBC 11.4 (84% granulocytosis),, hemoglobin 9.1 (baseline 
9.0,), MCV 76.4 ( microcytosis), platelet count 283, sodium 136, potassium 3.8, 
bicarbonate 37 (likely from diuretic use), BUN 28, creatinine 1.3 (baseline 1.5)
glucose 246, liver chemistries-AST 17, ALT 22, alkaline phosphatase 91.  Cardiac
enzymes-troponin I 0.02,
 
Chest x-ray revealed findings suggestive of mild fluid overload.  No pleural 
effusion or consolidation found.
 
EKG revealed Afib, no STTWI. 
 
Echocardiogram- 05 August 2017 
 
Technically difficult study. Definity contrast was used.  
 Left ventricular cavity size normal. Left ventricular wall thickness  
 mildly increased. No obvious regional wall motion abnormalities.  
 Left ventricular ejection fraction is estimated at > 55 %.  
 Right ventricle not well visualized. Mildly reduced right  
 ventricular global systolic function.  
 Mild left atrial dilatation.  
 Right ventricular systolic pressure estimated to be elevated at > 60  
 mmHg. No pericardial effusion.
 
 
Etiology in this case is likely acute decompensated heart failure which is a 
rapidly progressive failure state given a possible precipitating event, increase
salt intake/medication noncompliance/infection on chronic diastolic left-sided+ 
right-sided heart failure. In regards to her cellulitis, she should complete the
course.  Having chronic kidney disease could contribute to fluid overload.  
Other contributing factors could be elevated pulmonary artery pressures, and 
COPD exacerbation.  She should be on a renal adjusted dose of amoxicillin and 
eliquis. 
 
 
Problem list:
 
1. Acute decompensation of HFpEF
2. h/o Atrial fibrillation on DOAC
3. Right lower extremity cellulitis
4. History of coronary artery disease
5.  Chronic kidney disease
6.  COPD on 2 L nasal cannula
 
Plan:
 
-admit the patient on telemetry
-follow serial EKGs, troponins
-daily Ins and Outs
-daily weights
-IV diuretics furosemide 40 mg once a day, with re-assessing her kidney 
function. 
-Check BEP daily while on diuretics.
-Check TSHR
-2D cardiac echo to assess ejection fraction, valvular pathology or regional 
wall motion abnormalities 
-supplemental oxygen as needed
-Elevate head of the bed to reduce venous return
-Consider NIPPV to reduce work of breathing, improve oxygenation
-CHF diet, 2gm salt restriction
- on dietary compliance
-Consider cxr after adequate diuresis. 
-Continue Levemir at decreased dosing of 12 units twice a day.
-NovoLog sliding scale.  Accu-Cheks.
-Continue Eliquis at 2.5 mg by mouth twice a day, which is a decreased dose 
adjusted for her renal function.  Ideally she can get slightly higher dose, but 
considering the fact that she will be given intravenous furosemide in the next 
24 hours for diuresis, would continue the dose at 2.5 mg by mouth twice daily.
-Continue amoxicillin to complete the course, but has been renally adjusted.
-Check lower extremity Dopplers to rule out DVT.
 
Housekeeping:
 
#1 DVT prophylaxis-pharmacological
#2 GI prophylaxis-Protonix
#3 code-full code.

## 2018-04-07 VITALS — SYSTOLIC BLOOD PRESSURE: 122 MMHG | DIASTOLIC BLOOD PRESSURE: 70 MMHG

## 2018-04-07 VITALS — DIASTOLIC BLOOD PRESSURE: 78 MMHG | SYSTOLIC BLOOD PRESSURE: 130 MMHG

## 2018-04-07 VITALS — DIASTOLIC BLOOD PRESSURE: 80 MMHG | SYSTOLIC BLOOD PRESSURE: 146 MMHG

## 2018-04-07 NOTE — PN- HOUSESTAFF
RonMeliza 18 0845:
Subjective
Follow-up For:
As problem list in AP
Tele-Events Since Last Visit:
A-fib 90s-100s
Subjective:
No overnight event. Patient felt not rested due to interruptions of sleep at 
night by staffs. No other specific complaint. denied pain on RLE cellulitis. 
Would like 2 percocet instead of 1 for her chronic back pain.
 
Review of Systems
Constitutional:
Reports: see HPI. 
 
Objective
Last 24 Hrs of Vital Signs/I&O
 Vital Signs
 
 
Date Time Temp Pulse Resp B/P B/P Pulse O2 O2 Flow FiO2
 
     Mean Ox Delivery Rate 
 
 08      93 Nasal 2.0L 
 
       Cannula  
 
 0736 97.5 102 18 146/80  93 Nasal 2.0L 
 
       Cannula  
 
 0128  90    98   
 
 0123      96 Nasal 2.0L 
 
       Cannula  
 
 2350      93 Nasal 2.0L 
 
       Cannula  
 
 2336 98.3 93 18 138/70  95 Nasal 2.0L 
 
       Cannula  
 
 2222 97.8 98 20 130/72  94 Nasal 2.0L 
 
       Cannula  
 
 2132       Nasal 3.0L 
 
       Cannula  
 
 1906  101 20 114/64  96 Nasal 2.0L 
 
       Cannula  
 
 1730 97.1 106 22 124/71  95 Nasal 2.0L 
 
       Cannula  
 
 
 Intake & Output
 
 
  1600  0800  0000
 
Intake Total   
 
Output Total  350 
 
Balance  -350 
 
    
 
Output, Urine  350 
 
Patient  92.76 kg 92.533 kg
 
Weight   
 
Weight  Standing Scale Standing Scale
 
Measurement   
 
Method   
 
 
 
 
Physical Exam
General Appearance: Alert, Oriented X3, Cooperative, No Acute Distress
Cardiovascular: Irregular af-ib
Lungs: Clear to Auscultation, Normal Air Movement
Abdomen: Soft, No Tenderness
Neurological: Normal Speech
Extremities: No Edema, Normal Pulses, RLE cellulitits with mild erythema but no 
elevation of skin temp/tenderness, no limited ROM
Current Medications:
 Current Medications
 
 
  Sig/Mer[place]ez Start time  Last
 
Medication Dose Route Stop Time Status Admin
 
Amoxicillin/ 500 MG BID  0015 AC 
 
Clavulanate Potassium  PO   0556
 
Apixaban 2.5 MG BID  2329 AC 
 
  PO   0427
 
Atorvastatin Calcium 40 MG 1700  1700 AC 
 
  PO   
 
Budesonide/ 2 PUF BID  2330 AC 
 
Formoterol Fumarate  INH   0845
 
Furosemide 40 MG DAILY  1000 AC 
 
  IV   0842
 
Furosemide 0 .STK-MED ONE 2108 DC 
 
  IV   
 
Furosemide 40 MG ONCE ONE 2015 DC 
 
  IV 
 
Gabapentin 300 MG QPM  2200 CAN 
 
  PO   
 
Insulin Aspart 0 TIDAC 800 AC 
 
  SC   
 
Insulin Detemir 12 UNITS BID  234 AC 
 
  SC   
 
Isosorbide  60 MG DAILY  1000 CAN 
 
Mononitrate  PO   
 
Levothyroxine Sodium 0.1 MG DAILY  AC 
 
  PO   0556
 
Nystatin 1 JUAN BID  0100 AC 
 
  TOP   0845
 
Omeprazole 40 MG AT BEDTIME 2200 AC 
 
  PO   
 
Omeprazole 40 MG DAILY  DC 
 
  PO   
 
Oxycodone/ 1 TAB Q6-PRN PRN  234 AC 
 
Acetaminophen  PO   0558
 
Oxycodone/ 1 TAB ONCE ONE 2200 DC 
 
Acetaminophen  PO  220  0021
 
 
 
 
Last 24 Hrs of Lab/En Results
Last 24 Hrs of Labs/Mics:
 Laboratory Tests
 
18 0600:
Troponin I 0.03
 
18 1740:
Anion Gap 13, Estimated GFR 39  L, BUN/Creatinine Ratio 21.5, Glucose 246  H, 
Calcium 8.7, Total Bilirubin 0.7, AST 17, ALT 22, Alkaline Phosphatase 91, 
Troponin I 0.02, Pro-B-Natriuretic Pept 1630  H, Total Protein 6.7, Albumin 3.5,
Globulin 3.2, Albumin/Globulin Ratio 1.1, TSH &T3 &Free T4 Intrp 2.200, CBC w 
Diff NO MAN DIFF REQ, RBC 3.86  L, MCV 76.4  L, MCH 23.4  L, MCHC 30.7  L, RDW 
23.5  H, MPV 6.8  L, Gran % 84.0  H, Lymphocytes % 8.7  L, Monocytes % 5.9, 
Eosinophils % 1.3, Basophils % 0.1, Absolute Granulocytes 9.6  H, Absolute 
Lymphocytes 1.0  L, Absolute Monocytes 0.7  H, Absolute Eosinophils 0.2, 
Absolute Basophils 0
 
 
Assessment/Plan
Assessment:
Ms Irvin is an 83-year-old woman with past medical history of atrial 
fibrillation on anticoagulation with DOAC, coronary artery disease with prior 
CABG and subsequent PCI in 2013, chronic heart failure with preserved 
ejection fraction, pulmonary hypertension, hypertension, hyperlipidemia, COPD on
2 L oxygen, migraine, type 2 diabetes, hypothyroidism, anxiety came to the 
hospital with a chief concern of recent weight gain of approximately 5-10 pounds
in the last 1 week, worsening pedal edema, back pain.  Reported sleeping only on
her recliner.  Occasional cough.  Also reported decreased urination in the last 
3 weeks, and worsening abdominal distention.  She has been treated for 
cellulitis with amoxicillin, start date 2018. 
 
At the time of admission-vitals temperature 97.1, pulse rate 106, respiration 22
, blood pressure 124/71, pulse ox 95% on 2 L.
 
 
Problem list:
 
1. Acute decompensation of HFpEF
2. h/o Atrial fibrillation on DOAC
3. Right lower extremity cellulitis
4. History of coronary artery disease
5.  Chronic kidney disease
6.  COPD on 2 L nasal cannula
 
Plan:
 
-continue telemetry monitoring
-follow serial EKGs, troponins
-daily Ins and Outs, overnight negative fluid balance.
-daily weights
-IV diuretics furosemide 40 mg once a day, with re-assessing her kidney 
function. 
-Check BEP daily while on diuretics.
-TSHR had been negative
-2D cardiac echo to assess ejection fraction, valvular pathology or regional 
wall motion abnormalities 
-supplemental oxygen as needed, currently at 2LNC of her baseline
-Elevate head of the bed to reduce venous return
-Consider NIPPV to reduce work of breathing, improve oxygenation
-CHF diet, 2gm salt restriction
- on dietary compliance
-Continue Levemir at decreased dosing of 12 units twice a day.
-NovoLog sliding scale.  Accu-Cheks.
-Continue Eliquis at 2.5 mg by mouth twice a day, which is a decreased dose 
adjusted for her renal function.  Ideally she can get slightly higher dose, but 
considering the fact that she will be given intravenous furosemide in the next 
24 hours for diuresis, would continue the dose at 2.5 mg by mouth twice daily.
-Continue amoxicillin to complete the course, but has been renally adjusted.
-Check lower extremity Dopplers to rule out DVT.
-Confirmed on medication list that patient was recently removed from use of 
Metoprolol. Was not on her home med list per patient's daughter in room 2018.
 
Housekeeping:
 
#1 DVT prophylaxis-pharmacological
#2 GI prophylaxis-Protonix
#3 code-full code.
Problem List:
 1. CHF (congestive heart failure)
 
Pain Ratin
Pain Location:
NA
Pain Goal: Remain pain free
Pain Plan:
see AP
Tomorrow's Labs & Rationales:
Ayanna Oliveira 18 1024:
Attending MD Review Statement
 
Attending Statement
Attending MD Statement: examined this patient, discuss w/resident/PA/NP, agreed 
w/resident/PA/NP, discussed with family, reviewed EMR data (avail), discussed 
with nursing, discussed with case mgmt, reviewed images, amended to note
Attending Assessment/Plan:
Patient came with afib and Worsening lower extremity edema with acute on chronic
congestive heart failure preserved EF. Patient is on NOAC. Patient is started on
iv diuretis, caridology is consulted. Patient is on augmentin for right lower 
extremity cellulitis. Cont current care as per admitting physician..

## 2018-04-07 NOTE — CONS- CARDIOLOGY
General Information and HPI
 
Consulting Request
Date of Consult: 04/07/18
Requested By:
Alice Hsu MD
 
Reason for Consult:
Heart failure
History of Present Illness:
The patient is an 82-year-old female with history of CAD, status post CABG in 
2001, status post stent placement in 2013, hypertension, atrial fibrillation, 
and COPD. She was brought to the hospital by her daughter because of increased 
weight gain over the past few days.  She notes recent shortness of breath which 
is exacerbated by ambulation.  She recently had to increase her home oxygen from
2-3 L.  She also notes right lower extremity erythema.  No chest pain.  No 
palpitations.  No nausea or vomiting.  No palpitations.  No lightheadedness or 
dizziness.
 
Allergies/Medications
Allergies:
Coded Allergies:
NO KNOWN ALLERGIES (11/26/13)
 
Home Med List:
Albuterol Sulfate 2.5 MG/3 ML (0.083 %) VIAL.NEB   1 Vial INH/SOL Q6-PRN PRN 
SHORTNESS OF BREATH  (Reported)
Amoxicillin/Potassium Clav (Augmentin 875-125 Tablet) 875 MG-125 MG TABLET   1 
TAB PO BID cellulitis 
Apixaban (Eliquis) 2.5 MG TABLET   2.5 MG PO BID ATRIAL FIBRILLATION
     .
Fluticasone/Salmeterol (Advair 250-50 Diskus) 250 MCG-50 MCG/DOSE BLST.W.DEV   1
PUF INH BID PRN SHORTNESS OF BREATH  (Reported)
Furosemide (Lasix) 40 MG TABLET   1 TAB PO DAILY WATER RETENTION  (Reported)
Gabapentin 300 MG CAPSULE   1 CAP PO QPM SLEEP  (Reported)
Insulin Detemir (Levemir Flextouch) 100 UNIT/ML (3 ML) INSULN.PEN   34 U SC 
DAILY DIABETES  (Reported)
Isosorbide Mononitrate (Isosorbide Mononitrate ER) 60 MG TAB.ER.24H   1 TAB PO 
DAILY HEART  (Reported)
Levothyroxine Sodium 100 MCG TABLET   1 TAB PO DAILY AC THYROID  (Reported)
Lorazepam 0.5 MG TABLET   1 TAB PO QPMP PRN SLEEP  (Reported)
Metoprolol Succinate 100 MG TAB.ER.24H   1 TAB PO DAILY HEART  (Reported)
Omeprazole 40 MG CAPSULE.DR   1 CAP PO QPM ACID REFLUX  (Reported)
Oxycodone HCl/Acetaminophen (Oxycodone-Acetaminophen 5-325) 5 MG-325 MG TABLET  
1 TAB PO BIDP PRN PAIN  (Reported)
Repaglinide (Prandin) 2 MG TABLET   1 TAB PO TID DIABETES
     .
Simvastatin (Simvastatin*) 40 MG TABLET   1 TAB PO DAILY CHOLESTEROL  (Reported)
 
Current Medications:
 Current Medications
 
 
  Sig/Mercedez Start time  Last
 
Medication Dose Route Stop Time Status Admin
 
Albuterol Sulfate 3 ML TID 04/07 1600 AC 04/07
 
  INH   1838
 
Amoxicillin/ 500 MG BID 04/07 0015 AC 04/07
 
Clavulanate Potassium  PO   0556
 
Apixaban 2.5 MG BID 04/06 2329 AC 04/07
 
  PO   0427
 
Atorvastatin Calcium 40 MG 1700 04/07 1700 AC 04/07
 
  PO   1745
 
Budesonide/ 2 PUF BID 04/06 2330 AC 04/07
 
Formoterol Fumarate  INH   0845
 
Furosemide 40 MG DAILY 04/07 1000 AC 04/07
 
  IV   0842
 
Furosemide 0 .STK-MED ONE 04/06 2108 DC 
 
  IV   
 
Gabapentin 300 MG QPM 04/07 2200 CAN 
 
  PO   
 
Insulin Aspart 0 TIDAC 04/07 0800 AC 04/07
 
  SC   1745
 
Insulin Detemir 12 UNITS BID 04/06 2345 AC 04/07
 
  SC   1143
 
Isosorbide  60 MG DAILY 04/07 1000 CAN 
 
Mononitrate  PO   
 
Levothyroxine Sodium 0.1 MG DAILY AC 04/07 0700 AC 04/07
 
  PO   0556
 
Nystatin 1 JUAN BID 04/07 0100 AC 04/07
 
  TOP   0845
 
Omeprazole 40 MG AT BEDTIME 04/07 2200 AC 
 
  PO   
 
Omeprazole 40 MG DAILY AC 04/07 0700 DC 
 
  PO   
 
Oxycodone/ 2 TAB Q6-PRN PRN 04/07 1130 AC 04/07
 
Acetaminophen  PO   1745
 
Oxycodone/ 1 TAB Q6-PRN PRN 04/06 2345 DC 04/07
 
Acetaminophen  PO   0558
 
Oxycodone/ 1 TAB ONCE ONE 04/06 2200 DC 04/07
 
Acetaminophen  PO 04/06 2201  0021
 
 
 
 
Review of Systems
Review of Systems:
 
No rash.  No tremor.  No melena.  All other systems were reviewed, and were 
noted to be negative.
 
Past History
 
Travel History
Traveled to [place] past 21 day No
 
Medical History
Blood Transfusion Hx: Yes
Neurological: migraine (remote history), NEUROPATHY
EENT: NONE
Cardiovascular: AFIB, angina, CHF, hypertension, hyperlipidemia, "MULTIPLE MI'S
" QUAD BYPASS
Respiratory: COPD
Gastrointestinal: GERD
Hepatic: NONE
Renal: NONE
Musculoskeletal: NONE
Psychiatric: anxiety
Endocrine: diabetes, HYPOTHYROID
Blood Disorders: NONE
Cancer(s): NONE
 
Surgical History
Surgical History: appendectomy, arthroscopy, CABG, cholecystectomy, hysterectomy
, thyroid lobectomy rotator cuff repair
 
Family History
Relations & Conditions If Any:
FATHER
  FH: CABG (coronary artery bypass surgery)
  FH: diabetes mellitus
  FH: HTN (hypertension)
  FH: kidney disease
  FH: stroke
MOTHER
  FH: myocardial infarction
BROTHER
  FH: heart disease
SISTER
  FH: COPD (chronic obstructive pulmonary disease)
  FH: heart disease
 
 
Psychosocial History
Where Do You Live? Home
Services at Home: None
Smoking Status: Never Smoked
ETOH Use: denies use
Illicit Drug Use: denies illicit drug use
 
Exam & Diagnostic Data
Vital Signs and I&O
Vital Signs
 
 
Date Time Temp Pulse Resp B/P B/P Pulse O2 O2 Flow FiO2
 
     Mean Ox Delivery Rate 
 
04/07 1848      97 Nasal 2.0L 
 
       Cannula  
 
04/07 1600      93 Nasal 2.0L 
 
       Cannula  
 
04/07 1523 97.3 101 18 130/78  93   
 
04/07 1350       Nasal 2.0L 
 
       Cannula  
 
04/07 0800      93 Nasal 2.0L 
 
       Cannula  
 
04/07 0736 97.5 102 18 146/80  93 Nasal 2.0L 
 
       Cannula  
 
04/07 0128  90    98   
 
04/07 0123      96 Nasal 2.0L 
 
       Cannula  
 
04/06 2350      93 Nasal 2.0L 
 
       Cannula  
 
04/06 2336 98.3 93 18 138/70  95 Nasal 2.0L 
 
       Cannula  
 
04/06 2222 97.8 98 20 130/72  94 Nasal 2.0L 
 
       Cannula  
 
04/06 2132       Nasal 3.0L 
 
       Cannula  
 
 
 Intake & Output
 
 
 04/07 1600 04/07 0800 04/07 0000 04/06 1600 04/06 0800 04/06 0000
 
Intake Total 560     
 
Output Total 600 350    
 
Balance -40 -350    
 
       
 
Intake, Oral 560     
 
Output, Urine 600 350    
 
Patient  205 lb 204 lb   
 
Weight      
 
Weight  Standing Scale Standing Scale   
 
Measurement      
 
Method      
 
 
 
Physical Exam:
Gen: The patient is in no acute distress
HEENT: Normal nose, ears, and oropharynx.  Pupils equal bilaterally.  
Conjunctiva normal.
Neck: Supple with no JVD, no masses, and no thyromegaly
Lungs: Scattered wheezes bilaterally with normal respiratory effort
Heart: RRR, S1, S2, no murmurs.  2+ peripheral edema, 2+ pulses in the lower 
extremities bilaterally
Abdomen:  Soft, nontender, no masses.  No hepatomegaly.  No splenomegaly
Extremities: No clubbing or cyanosis.  Normal muscle strength in the upper and 
lower extremities
Skin: Normal skin turgor right lower extremity erythema
Neuro: Cranial nerves intact.  Sensation intact
Psych: Alert and oriented x 3 with appropriate affect
 
 
 
Labs/En Results:
 Laboratory Tests
 
 
 04/07 04/07 04/06
 
 1530 0600 1740
 
Chemistry   
 
  Sodium (137 - 145 mmol/L)   136  L
 
  Potassium (3.5 - 5.1 mmol/L)   3.8
 
  Chloride (98 - 107 mmol/L)   93  L
 
  Carbon Dioxide (22 - 30 mmol/L)   31  H
 
  Anion Gap (5 - 16)   13
 
  BUN (7 - 17 mg/dL)   28  H
 
  Creatinine (0.5 - 1.0 mg/dL)   1.3  H
 
  Estimated GFR (>60 ml/min)   39  L
 
  BUN/Creatinine Ratio (7 - 25 %)   21.5
 
  Glucose (65 - 99 mg/dL)   246  H
 
  Calcium (8.4 - 10.2 mg/dL)   8.7
 
  Total Bilirubin (0.2 - 1.3 mg/dL)   0.7
 
  AST (14 - 36 U/L)   17
 
  ALT (9 - 52 U/L)   22
 
  Alkaline Phosphatase (<127 U/L)   91
 
  Troponin I (< 0.11 ng/ml) 0.02 0.03 0.02
 
  Pro-B-Natriuretic Pept (<125 pg/mL)   1630  H
 
  Total Protein (6.3 - 8.2 g/dL)   6.7
 
  Albumin (3.5 - 5.0 g/dL)   3.5
 
  Globulin (1.9 - 4.2 gm/dL)   3.2
 
  Albumin/Globulin Ratio (1.1 - 2.2 %)   1.1
 
  TSH &T3 &Free T4 Intrp (0.270 - 4.20 uIU/mL)   2.200
 
Hematology   
 
  CBC w Diff   NO MAN DIFF REQ
 
  WBC (4.8 - 10.8 /CUMM)   11.4  H
 
  RBC (4.20 - 5.40 /CUMM)   3.86  L
 
  Hgb (12.0 - 16.0 G/DL)   9.1  L
 
  Hct (37 - 47 %)   29.5  L
 
  MCV (81.0 - 99.0 FL)   76.4  L
 
  MCH (27.0 - 31.0 PG)   23.4  L
 
  MCHC (33.0 - 37.0 G/DL)   30.7  L
 
  RDW (11.5 - 14.5 %)   23.5  H
 
  Plt Count (130 - 400 /CUMM)   283
 
  MPV (7.4 - 10.4 FL)   6.8  L
 
  Gran % (42.2 - 75.2 %)   84.0  H
 
  Lymphocytes % (20.5 - 51.1 %)   8.7  L
 
  Monocytes % (1.7 - 9.3 %)   5.9
 
  Eosinophils % (0 - 5 %)   1.3
 
  Basophils % (0.0 - 2.0 %)   0.1
 
  Absolute Granulocytes (1.4 - 6.5 /CUMM)   9.6  H
 
  Absolute Lymphocytes (1.2 - 3.4 /CUMM)   1.0  L
 
  Absolute Monocytes (0.10 - 0.60 /CUMM)   0.7  H
 
  Absolute Eosinophils (0.0 - 0.7 /CUMM)   0.2
 
  Absolute Basophils (0.0 - 0.2 /CUMM)   0
 
 
 
 
Diagnostic Data
EKG Results
EKG tracing is independently reviewed, and reveals atrial fibrillation with 
ventricular response of 87, poor R-wave progression, nonspecific T-wave 
abnormality
CXR Results
Findings suggestive of mild fluid overload. No pleural effusion or
consolidation.
Other Results
Right lower external Doppler study:
1. Normal triplex scan without evidence of deep venous thrombosis involving
the lower extremity.
2. Nonspecific loculated the fluid collection mid depth anterior mid thigh 3
cm, raising concern for possible abscess. May consider cross-sectional
imaging with contrast and/or drainage.
 
Labs 8/5/17:
 Technically difficult study. Definity contrast was used.  
 Left ventricular cavity size normal. Left ventricular wall thickness  
 mildly increased. No obvious regional wall motion abnormalities.  
 Left ventricular ejection fraction is estimated at > 55 %.  
 Right ventricle not well visualized. Mildly reduced right  
 ventricular global systolic function.  
 Mild left atrial dilatation.  
 Right ventricular systolic pressure estimated to be elevated at > 60  
 mmHg.  
 No pericardial effusion.  
 
 
Assessment/Plan
Assessment/Plan
Assessment:
83-year-old female with history of CAD, hypertension, atrial fibrillation, and 
COPD presenting with weight gain, increased lower extremity edema, and shortness
of breath.  Presentation is consistent with acute on chronic heart failure with 
preserved ejection fraction.  She is also noted to have evidence of right lower 
extremity cellulitis.
 
Recommendations:
Would increase Lasix to 40 mg IV every 12 hours
Monitor input and output with daily weights
Check basic metabolic profile daily
Continue anticoagulation with Eliquis
 
 
Consult Acknowledgment
- Thank you for your consult request.

## 2018-04-07 NOTE — ULTRASOUND REPORT
EXAMINATION:
US TRIPLEX LOWER EXTREMITY, RIGHT
 
CLINICAL INFORMATION:
Pain
 
COMPARISON:
None
 
TECHNIQUE:
Color-flow triplex imaging with spectral analysis and compression Doppler
were performed on the lower extremity.
 
FINDINGS:
Respiratory variation, normal compression and augmented flow are noted
throughout the lower extremity. The visualized common femoral vein,
superficial femoral vein, profunda femoral vein, popliteal vein and midcalf
peroneal and posterior tibial venous segments show no evidence of deep venous
thrombosis.
 
There is loculated the fluid collection in the anterior mid thigh measure 2.5
x 1.1 x 3 cm, nonspecific, raising concern for possible an abscess, seroma if
patient has prior surgery or hematoma.
 
IMPRESSION:
 
1. Normal triplex scan without evidence of deep venous thrombosis involving
the lower extremity.
2. Nonspecific loculated the fluid collection mid depth anterior mid thigh 3
cm, raising concern for possible abscess. May consider cross-sectional
imaging with contrast and/or drainage.

## 2018-04-07 NOTE — ADMISSION CERTIFICATION
Admission Certification
 
Certification Statement
- As attending physician, I certify that at the time of
- admission, based on clinical presentation, severity of
- symptoms, need for further diagnostic testing and
- therapeutic interventions, and risk of adverse outcomes
- without in-hospital treatment, in my clinical assessment,
- this patient requires an acute hospital stay for a minimum
- of two nights or longer. I have also considered psychsocial
- factors such as support system, advanced age, financial
- issues, cognitive issues, and failed out-patient treatments,
- past re-admission history, safety of patient, and lack of
- compliance as applicable.
Specific rationale supporting this admission is:
Acute on chronic heart failure with preserved ejection fraction

## 2018-04-08 VITALS — DIASTOLIC BLOOD PRESSURE: 62 MMHG | SYSTOLIC BLOOD PRESSURE: 124 MMHG

## 2018-04-08 VITALS — SYSTOLIC BLOOD PRESSURE: 132 MMHG | DIASTOLIC BLOOD PRESSURE: 60 MMHG

## 2018-04-08 VITALS — DIASTOLIC BLOOD PRESSURE: 68 MMHG | SYSTOLIC BLOOD PRESSURE: 140 MMHG

## 2018-04-08 LAB
ABSOLUTE GRANULOCYTE CT: 7 /CUMM (ref 1.4–6.5)
BASOPHILS # BLD: 0 /CUMM (ref 0–0.2)
BASOPHILS NFR BLD: 0.4 % (ref 0–2)
EOSINOPHIL # BLD: 0.2 /CUMM (ref 0–0.7)
EOSINOPHIL NFR BLD: 1.9 % (ref 0–5)
ERYTHROCYTE [DISTWIDTH] IN BLOOD BY AUTOMATED COUNT: 22.5 % (ref 11.5–14.5)
GRANULOCYTES NFR BLD: 80 % (ref 42.2–75.2)
HCT VFR BLD CALC: 28.4 % (ref 37–47)
LYMPHOCYTES # BLD: 1.1 /CUMM (ref 1.2–3.4)
MCH RBC QN AUTO: 25 PG (ref 27–31)
MCHC RBC AUTO-ENTMCNC: 32.7 G/DL (ref 33–37)
MCV RBC AUTO: 76.5 FL (ref 81–99)
MONOCYTES # BLD: 0.5 /CUMM (ref 0.1–0.6)
PLATELET # BLD: 247 /CUMM (ref 130–400)
PMV BLD AUTO: 7.9 FL (ref 7.4–10.4)
RED BLOOD CELL CT: 3.71 /CUMM (ref 4.2–5.4)
WBC # BLD AUTO: 8.8 /CUMM (ref 4.8–10.8)

## 2018-04-08 NOTE — ECHOCARDIOGRAM REPORT
ENA THOMPSON 
 
 Age:    83     :    1935      Gender:     F 
 
 MRN:    [id number] 
 
 Exam Date:     2018  
                10:51 
 
 Exam Location: 1  
 [place] 
 
 Ht (in):     61      Wt (lb):      209     BSA:    2.07 
 
 BP:          124     /     62 
 
 Ordering Physician:        Katie Ross MD 
 
 Referring Physician:       Marcin Lopez MD 
 
 Technologist:              Linh Elam [facility] 
 
 Room Number:               183 
 
 Indications:       HEART FAILURE 
 
 Rhythm:                 Atrial fibrillation 
 
 Technical Quality:      fair 
 
 FINDINGS 
 
 Left Ventricle 
 Normal size left ventricle. Left ventricular wall thickness  
 moderately increased. Normal left ventricular ejection fraction  
 estimated at 65-70%. 
 
 Right Ventricle 
 Mild right ventricular dilatation. Mildly reduced right ventricular  
 global systolic function. 
 
 Right Atrium 
 Mild right atrial dilatation. 
 
 Left Atrium 
 Moderate left atrial dilatation. 
 
 Mitral Valve 
 Mild mitral annular calcification. Trace to mild mitral  
 regurgitation. 
 
 Aortic Valve 
 Diffuse thickening (sclerosis) of the aortic valve cusps without  
 reduced excursion. 
 
 Tricuspid Valve 
 Tricuspid valve is normal in structure and function. Mild tricuspid  
 regurgitation. 
 
 Pulmonic Valve 
 Pulmonic valve not well visualized, grossly normal. 
 
 Pericardium 
 No pericardial effusion. 
 
 Great Vessels 
 Normal size aortic root. 
 
 CONCLUSIONS 
 Normal left ventricular systolic function with moderate concentric  
 hypertrophy. Moderate left atrial enlargement. Mild dilated and  
 hypokinetic Right ventricle. Cannot estimate Pulmonary systolic  
 pressure. 
 
 Marcin Lopez M.D. 
 (Electronically Signed) 
 Final Date:      2018  
                  16:51 
 
 MEASUREMENTS  (Male / Female) Normal Values 
 
 2D ECHO 
 LV Diastolic Diameter PLAX        3.9 cm                4.2 - 5.9 / 3.9 - 5.3 
cm 
 LV Systolic Diameter PLAX         2.2 cm                2.1 - 4.0 cm 
 LV Fractional Shortening PLAX     43.6 %                25 - 46  % 
 LV Ejection Fraction 2D Teich     75.4 %                 
 IVS Diastolic Thickness           1.5 cm                 
 LVPW Diastolic Thickness          1.5 cm                 
 LV Relative Wall Thickness        0.8                    
 RV Internal Dim ED PLAX           3.5 cm                1.9 - 3.8 cm 
 LVOT Diameter                     1.8 cm                 
 Aortic Root Diameter              3.1 cm                 
 LA Systolic Diameter LX           5.0 cm                3.0 - 4.0 / 2.7 - 3.8 
cm 
 Ascending Aorta Diameter          3.2 cm                 
 
 DOPPLER 
 AV Peak Velocity                  130.0 cm/s             
 AV Peak Gradient                  6.8 mmHg               
 AV Mean Velocity                  85.2 cm/s              
 AV Mean Gradient                  3.0 mmHg               
 AV Velocity Time Integral         24.1 cm                
 LVOT Peak Velocity                71.4 cm/s              
 LVOT Peak Gradient                2.0 mmHg               
 LVOT Mean Velocity                55.3 cm/s              
 LVOT Mean Gradient                1.0 mmHg               
 LVOT Velocity Time Integral       12.7 cm                
 LVOT Stroke Volume                32.3 cm               
 AV Area Cont Eq vti               1.3 cm                
 AV Area Cont Eq pk                1.4 cm                
 MV Peak Velocity                  125.0 cm/s             
 MV Peak Gradient                  6.3 mmHg               
 MV Mean Velocity                  63.5 cm/s              
 MV Mean Gradient                  2.0 mmHg               
 Mitral E Point Velocity           114.0 cm/s             
 MV PHT Velocity                   130.0 cm/s             
 MV Deceleration Collier             539.0 cm/s            
 MV Pressure Half Time             72.4 ms                
 MV Area PHT                       3.0 cm                
 MV Deceleration Time              172.0 ms               
 PV Peak Velocity                  80.6 cm/s              
 PV Peak Gradient                  2.6 mmHg               
 PV Mean Velocity                  48.9 cm/s              
 PV Mean Gradient                  1.0 mmHg               
 PV Velocity Time Integral         11.2 cm                
 LV E' Lateral Velocity            13.2 cm/s              
 Mitral E to LV E' Lateral Ratio   8.6                    
 LV E' Septal Velocity             8.5 cm/s               
 Mitral E to LV E' Septal Ratio    13.4

## 2018-04-08 NOTE — PN- PULMONARY
Subjective
HPI/Critical Care Issues:
No overnight events.  Patient remained afebrile.  Seen and examined this 
morning.  Patient denied any chest pain, palpitation, nausea, vomiting, chills, 
fever, abdominal pain and dysuria.  She has exertional short of breath but 
patient reported that that's her baseline.  Patient is using CPAP at nighttime 
and using 3 L of oxygen and maintaining saturation 95%
 
Review of Systems
Constitutional:
Denies: chills, fever. 
EENTM:
Reports: no symptoms. 
Cardiovascular:
Denies: chest pain, palpitations. 
Respiratory:
Reports: see HPI. 
Gastrointestinal:
Reports: no symptoms. 
Genitourinary:
Reports: no symptoms. 
Musculoskeletal:
Reports: no symptoms. 
Neurological/Psychological:
Reports: no symptoms. 
 
Objective
Current Medications:
 Current Medications
 
 
  Sig/Mercedez Start time  Last
 
Medication Dose Route Stop Time Status Admin
 
Albuterol Sulfate 3 ML TID 04/07 1600 AC 04/08
 
  INH   0904
 
Amoxicillin/ 500 MG BID 04/07 0015 AC 04/08
 
Clavulanate Potassium  PO   0831
 
Apixaban 2.5 MG BID 04/06 2329 AC 04/08
 
  PO   0831
 
Atorvastatin Calcium 40 MG 1700 04/07 1700 AC 04/07
 
  PO   1745
 
Budesonide/ 2 PUF BID 04/06 2330 AC 04/08
 
Formoterol Fumarate  INH   0831
 
Furosemide 40 MG DAILY 04/07 1000 AC 04/08
 
  IV   0831
 
Insulin Aspart 0 TIDAC 04/07 0800 AC 04/08
 
  SC   0830
 
Insulin Detemir 12 UNITS BID 04/06 2345 AC 04/08
 
  SC   0830
 
Levothyroxine Sodium 0.1 MG DAILY AC 04/07 0700 AC 04/08
 
  PO   0627
 
Nystatin 1 JUAN BID 04/07 0100 AC 04/08
 
  TOP   0836
 
Omeprazole 40 MG AT BEDTIME 04/07 2200 AC 04/07
 
  PO   2147
 
Oxycodone/ 2 TAB Q6-PRN PRN 04/07 1130 AC 04/08
 
Acetaminophen  PO   0631
 
 
 
 
Vital Signs & I&O
Last 24 Hrs of Vitals and I&O:
 Vital Signs
 
 
Date Time Temp Pulse Resp B/P B/P Pulse O2 O2 Flow FiO2
 
     Mean Ox Delivery Rate 
 
04/08 0909      95 Nasal 2.0L 
 
       Cannula  
 
04/08 0800       Nasal 2.0L 
 
       Cannula  
 
04/08 0711 97.7 87 18 124/62  97 BIPAP  
 
04/07 2220 97.5 96 18 122/70  94 BIPAP  
 
04/07 2101  98    96   
 
04/07 1848      97 Nasal 2.0L 
 
       Cannula  
 
04/07 1600      93 Nasal 2.0L 
 
       Cannula  
 
04/07 1523 97.3 101 18 130/78  93   
 
04/07 1350       Nasal 2.0L 
 
       Cannula  
 
 
 Intake & Output
 
 
 04/08 1600 04/08 0800 04/08 0000
 
Intake Total  280 620
 
Output Total  250 200
 
Balance  30 420
 
    
 
Intake, Oral  280 620
 
Number   0
 
Bowel   
 
Movements   
 
Output, Urine  250 200
 
 
 Laboratory Tests
 
 
 04/08 04/07 04/07
 
 0654 1530 0600
 
Chemistry   
 
  Sodium (137 - 145 mmol/L) 135  L  
 
  Potassium (3.5 - 5.1 mmol/L) 3.9  
 
  Chloride (98 - 107 mmol/L) 91  L  
 
  Carbon Dioxide (22 - 30 mmol/L) 29  
 
  Anion Gap (5 - 16) 15  
 
  BUN (7 - 17 mg/dL) 27  H  
 
  Creatinine (0.5 - 1.0 mg/dL) 1.4  H  
 
  Estimated GFR (>60 ml/min) 36  L  
 
  BUN/Creatinine Ratio (7 - 25 %) 19.3  
 
  Troponin I (< 0.11 ng/ml)  0.02 0.03
 
Hematology   
 
  CBC w Diff NO MAN DIFF REQ  
 
  WBC (4.8 - 10.8 /CUMM) 8.8  
 
  RBC (4.20 - 5.40 /CUMM) 3.71  L  
 
  Hgb (12.0 - 16.0 G/DL) 9.3  L  
 
  Hct (37 - 47 %) 28.4  L  
 
  MCV (81.0 - 99.0 FL) 76.5  L  
 
  MCH (27.0 - 31.0 PG) 25.0  L  
 
  MCHC (33.0 - 37.0 G/DL) 32.7  L  
 
  RDW (11.5 - 14.5 %) 22.5  H  
 
  Plt Count (130 - 400 /CUMM) 247  
 
  MPV (7.4 - 10.4 FL) 7.9  
 
  Gran % (42.2 - 75.2 %) 80.0  H  
 
  Lymphocytes % (20.5 - 51.1 %) 12.5  L  
 
  Monocytes % (1.7 - 9.3 %) 5.2  
 
  Eosinophils % (0 - 5 %) 1.9  
 
  Basophils % (0.0 - 2.0 %) 0.4  
 
  Absolute Granulocytes (1.4 - 6.5 /CUMM) 7.0  H  
 
  Absolute Lymphocytes (1.2 - 3.4 /CUMM) 1.1  L  
 
  Absolute Monocytes (0.10 - 0.60 /CUMM) 0.5  
 
  Absolute Eosinophils (0.0 - 0.7 /CUMM) 0.2  
 
  Absolute Basophils (0.0 - 0.2 /CUMM) 0  
 
 
 
 
 04/06
 
 1740
 
Chemistry 
 
  Sodium (137 - 145 mmol/L) 136  L
 
  Potassium (3.5 - 5.1 mmol/L) 3.8
 
  Chloride (98 - 107 mmol/L) 93  L
 
  Carbon Dioxide (22 - 30 mmol/L) 31  H
 
  Anion Gap (5 - 16) 13
 
  BUN (7 - 17 mg/dL) 28  H
 
  Creatinine (0.5 - 1.0 mg/dL) 1.3  H
 
  Estimated GFR (>60 ml/min) 39  L
 
  BUN/Creatinine Ratio (7 - 25 %) 21.5
 
  Glucose (65 - 99 mg/dL) 246  H
 
  Calcium (8.4 - 10.2 mg/dL) 8.7
 
  Total Bilirubin (0.2 - 1.3 mg/dL) 0.7
 
  AST (14 - 36 U/L) 17
 
  ALT (9 - 52 U/L) 22
 
  Alkaline Phosphatase (<127 U/L) 91
 
  Troponin I (< 0.11 ng/ml) 0.02
 
  Pro-B-Natriuretic Pept (<125 pg/mL) 1630  H
 
  Total Protein (6.3 - 8.2 g/dL) 6.7
 
  Albumin (3.5 - 5.0 g/dL) 3.5
 
  Globulin (1.9 - 4.2 gm/dL) 3.2
 
  Albumin/Globulin Ratio (1.1 - 2.2 %) 1.1
 
  TSH &T3 &Free T4 Intrp (0.270 - 4.20 uIU/mL) 2.200
 
Hematology 
 
  CBC w Diff NO MAN DIFF REQ
 
  WBC (4.8 - 10.8 /CUMM) 11.4  H
 
  RBC (4.20 - 5.40 /CUMM) 3.86  L
 
  Hgb (12.0 - 16.0 G/DL) 9.1  L
 
  Hct (37 - 47 %) 29.5  L
 
  MCV (81.0 - 99.0 FL) 76.4  L
 
  MCH (27.0 - 31.0 PG) 23.4  L
 
  MCHC (33.0 - 37.0 G/DL) 30.7  L
 
  RDW (11.5 - 14.5 %) 23.5  H
 
  Plt Count (130 - 400 /CUMM) 283
 
  MPV (7.4 - 10.4 FL) 6.8  L
 
  Gran % (42.2 - 75.2 %) 84.0  H
 
  Lymphocytes % (20.5 - 51.1 %) 8.7  L
 
  Monocytes % (1.7 - 9.3 %) 5.9
 
  Eosinophils % (0 - 5 %) 1.3
 
  Basophils % (0.0 - 2.0 %) 0.1
 
  Absolute Granulocytes (1.4 - 6.5 /CUMM) 9.6  H
 
  Absolute Lymphocytes (1.2 - 3.4 /CUMM) 1.0  L
 
  Absolute Monocytes (0.10 - 0.60 /CUMM) 0.7  H
 
  Absolute Eosinophils (0.0 - 0.7 /CUMM) 0.2
 
  Absolute Basophils (0.0 - 0.2 /CUMM) 0
 
 
 
 
Impression/Plan
 
Impression/Plan
Impression/Plan:
General Appearance Alert, Oriented X3, Cooperative, No Acute Distress
HEENT Atraumatic, PERRLA, EOMI, Mucous Membr. moist/pink
Neck Supple
Cardiovascular Normal S1, Normal S2
Lungs Normal Air Movement, minimal widespreas wheezes
Abdomen Normal Bowel Sounds, Soft, abd distension
Neurological Normal Speech, Strength at 5/5 X4 Ext, Normal Tone, Sensation 
Intact, Cranial Nerves 3-12 NL
Extremities bilateral 2 + pitting edema, right LE erythema is receeding from the
line marking done in the ED previously to monitor cellulitis
Vascular Normal Pulses
 
CXR mild chf
 
IMPRESSION
 
Ms Irvin is an 83-year-old woman with past medical history of atrial 
fibrillation on anticoagulation with NOAC, coronary artery disease with prior 
CABG and subsequent PCI in July of 2013, chronic heart failure with preserved 
ejection fraction, Secondary pulmonary hypertension, hypertension, 
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 
migraine, type 2 diabetes, hypothyroidism, anxiety 
 
* Now with decompensated chf with rt more than left chf with preserved ef
* Pt has sig chronic bronchiolitis with airtrapping and has chronic hypoxia (
with preserved FEV1)
* Sig Clare using her cpap and compliant
* Chronic pedal edema with erythema, due to venous insuff from her vein 
harvesting from cabg
* pulm htn due to clare, diastolic heart, and bronchiolitis
* Morbid obesity/anxietyCKD/ Chronic anemia
 
REC
Cont cpap at hs
Diuresis ongoing
Cont oxygen and wean down to sat of 90 percent
Try to reduce narcotics
Cont levoxyl
Cont symbicort
Nebs only if wheezing
Lasix
Will follow

## 2018-04-08 NOTE — PN- CARDIOLOGY
Subjective
Subjective:
Shortness of breath is somewhat better.  Lower extremity edema is improving.  No
chest pain.  No palpitations.  No diaphoresis.  No nausea or vomiting
 
Objective
Vital Signs and I&Os
Vital Signs
 
 
Date Time Temp Pulse Resp B/P B/P Pulse O2 O2 Flow FiO2
 
     Mean Ox Delivery Rate 
 
04/08 0909      95 Nasal 2.0L 
 
       Cannula  
 
04/08 0800       Nasal 2.0L 
 
       Cannula  
 
04/08 0711 97.7 87 18 124/62  97 BIPAP  
 
04/07 2220 97.5 96 18 122/70  94 BIPAP  
 
04/07 2101  98    96   
 
04/07 1848      97 Nasal 2.0L 
 
       Cannula  
 
04/07 1600      93 Nasal 2.0L 
 
       Cannula  
 
04/07 1523 97.3 101 18 130/78  93   
 
 
 Intake & Output
 
 
 04/08 1600 04/08 0800 04/08 0000 04/07 1600 04/07 0800 04/07 0000
 
Intake Total  280 620 560  
 
Output Total  250 200 600 350 
 
Balance  30 420 -40 -350 
 
       
 
Intake, Oral  280 620 560  
 
Number   0   
 
Bowel      
 
Movements      
 
Output, Urine  250 200 600 350 
 
Patient     205 lb 204 lb
 
Weight      
 
Weight     Standing Scale Standing Scale
 
Measurement      
 
Method      
 
 
 
Physical Exam:
Gen: The patient is in no acute distress
HEENT: Normal nose, ears, and oropharynx.  Pupils equal bilaterally.  
Conjunctiva normal.
Neck: Supple with no JVD, no masses, and no thyromegaly
Lungs: Scattered wheezes bilaterally with normal respiratory effort
Heart: RRR, S1, S2, no murmurs.  2+ peripheral edema, 2+ pulses in the lower 
extremities bilaterally
Abdomen:  Soft, nontender, no masses.  No hepatomegaly.  No splenomegaly
Extremities: No clubbing or cyanosis.  Normal muscle strength in the upper and 
lower extremities
Skin: Normal skin turgor, right lower extremity erythema
Neuro: Cranial nerves intact.  Sensation intact
Psych: Alert and oriented x 3 with appropriate affect
Current Medications:
 Current Medications
 
 
  Sig/[place]ez Start time  Last
 
Medication Dose Route Stop Time Status Admin
 
Albuterol Sulfate 3 ML TID 04/07 1600 AC 04/08
 
  INH   1310
 
Amoxicillin/ 500 MG BID 04/07 0015 AC 04/08
 
Clavulanate Potassium  PO   0831
 
Apixaban 2.5 MG BID 04/06 2329 AC 04/08
 
  PO   0831
 
Atorvastatin Calcium 40 MG 1700 04/07 1700 AC 04/07
 
  PO   1745
 
Budesonide/ 2 PUF BID 04/06 2330 AC 04/08
 
Formoterol Fumarate  INH   0831
 
Furosemide 40 MG DAILY 04/07 1000 AC 04/08
 
  IV   0831
 
Insulin Aspart 0 TIDAC 04/07 0800 AC 04/08
 
  SC   1209
 
Insulin Detemir 12 UNITS BID 04/06 2345 AC 04/08
 
  SC   0830
 
Levothyroxine Sodium 0.1 MG DAILY AC 04/07 0700 AC 04/08
 
  PO   0627
 
Nystatin 1 JUAN BID 04/07 0100 AC 04/08
 
  TOP   0836
 
Omeprazole 40 MG AT BEDTIME 04/07 2200 AC 04/07
 
  PO   2147
 
Oxycodone/ 2 TAB Q6-PRN PRN 04/07 1130 AC 04/08
 
Acetaminophen  PO   1405
 
 
 
 
Results
Last 48 Hrs of Labs/Mics:
 Laboratory Tests
 
04/08/18 0654:
Anion Gap 15, Estimated GFR 36  L, BUN/Creatinine Ratio 19.3, CBC w Diff NO MAN 
DIFF REQ, RBC 3.71  L, MCV 76.5  L, MCH 25.0  L, MCHC 32.7  L, RDW 22.5  H, MPV 
7.9, Gran % 80.0  H, Lymphocytes % 12.5  L, Monocytes % 5.2, Eosinophils % 1.9, 
Basophils % 0.4, Absolute Granulocytes 7.0  H, Absolute Lymphocytes 1.1  L, 
Absolute Monocytes 0.5, Absolute Eosinophils 0.2, Absolute Basophils 0
 
04/07/18 1530:
Troponin I 0.02
 
04/07/18 0600:
Troponin I 0.03
 
04/06/18 1740:
Anion Gap 13, Estimated GFR 39  L, BUN/Creatinine Ratio 21.5, Glucose 246  H, 
Calcium 8.7, Total Bilirubin 0.7, AST 17, ALT 22, Alkaline Phosphatase 91, 
Troponin I 0.02, Pro-B-Natriuretic Pept 1630  H, Total Protein 6.7, Albumin 3.5,
Globulin 3.2, Albumin/Globulin Ratio 1.1, TSH &T3 &Free T4 Intrp 2.200, CBC w 
Diff NO MAN DIFF REQ, RBC 3.86  L, MCV 76.4  L, MCH 23.4  L, MCHC 30.7  L, RDW 
23.5  H, MPV 6.8  L, Gran % 84.0  H, Lymphocytes % 8.7  L, Monocytes % 5.9, 
Eosinophils % 1.3, Basophils % 0.1, Absolute Granulocytes 9.6  H, Absolute 
Lymphocytes 1.0  L, Absolute Monocytes 0.7  H, Absolute Eosinophils 0.2, 
Absolute Basophils 0
 
 
Assessment/Plan
Assessment/Plan
Assessment:
1.  Atrial fibrillation
2.  CAD
2.  Hypertension
4.  COPD
5.  Acute on chronic HFpEF
 
Plan:
* Would increase Lasix to 40 mg IV every 12 hours
* Monitor input and output with daily weights
* Check basic metabolic profile daily
* Continue anticoagulation with Eliquis
* Continue other cardiac medications
Continue telemetry? No

## 2018-04-08 NOTE — PN- HOUSESTAFF
Subjective
Follow-up For:
Acute on chronic diastolic CHF
Right leg cellulitis
Tele-Events Since Last Visit:
Patient remained in A. fib with heart rate 8994
Subjective:
No overnight events.  Patient remained afebrile.  Seen and examined this 
morning.  Patient denied any chest pain, palpitation, nausea, vomiting, chills, 
fever, abdominal pain and dysuria.  She has exertional short of breath but 
patient reported that that's her baseline.  Patient is using CPAP at nighttime 
and using 3 L of oxygen and maintaining saturation 95%
 
Review of Systems
Constitutional:
Denies: chills, fever. 
EENTM:
Reports: no symptoms. 
Cardiovascular:
Denies: chest pain, palpitations. 
Respiratory:
Reports: see HPI. 
Gastrointestinal:
Reports: no symptoms. 
Genitourinary:
Reports: no symptoms. 
Musculoskeletal:
Reports: no symptoms. 
Neurological/Psychological:
Reports: no symptoms. 
 
Objective
Last 24 Hrs of Vital Signs/I&O
 Vital Signs
 
 
Date Time Temp Pulse Resp B/P B/P Pulse O2 O2 Flow FiO2
 
     Mean Ox Delivery Rate 
 
 0909      95 Nasal 2.0L 
 
       Cannula  
 
 0800       Nasal 2.0L 
 
       Cannula  
 
 0711 97.7 87 18 124/62  97 BIPAP  
 
 2220 97.5 96 18 122/70  94 BIPAP  
 
 2101  98    96   
 
 1848      97 Nasal 2.0L 
 
       Cannula  
 
 1600      93 Nasal 2.0L 
 
       Cannula  
 
 1523 97.3 101 18 130/78  93   
 
 1350       Nasal 2.0L 
 
       Cannula  
 
 
 Intake & Output
 
 
  1600  0800  0000
 
Intake Total  280 620
 
Output Total  250 200
 
Balance  30 420
 
    
 
Intake, Oral  280 620
 
Number   0
 
Bowel   
 
Movements   
 
Output, Urine  250 200
 
 
 
 
Physical Exam
General Appearance: Alert, Oriented X3, Cooperative
Skin: No Rashes
Skin Temp/Moisture Exam: Warm/Dry
Sepsis Skin Exam (color): Normal for Ethnicity
HEENT: Atraumatic, PERRLA, EOMI
Neck: Supple
Cardiovascular: Normal S1, Normal S2
Lungs: B/L decreased breath sounds with crepitus
Abdomen: Soft, No Tenderness
Neurological: Normal Speech, Normal Tone
Extremities: B/L pedal edema
 
Assessment/Plan
Assessment:
83-year-old woman with past medical history of atrial fibrillation on 
anticoagulation with DOAC, coronary artery disease with prior CABG and 
subsequent PCI in 2013, chronic heart failure with preserved ejection 
fraction, pulmonary hypertension, hypertension, hyperlipidemia, COPD on 2 L 
oxygen, migraine, type 2 diabetes, hypothyroidism, anxiety came to the hospital 
with a chief concern of recent weight gain of approximately 5-10 pounds in the 
last 1 week, worsening pedal edema, back pain.
 
We'll follow the patient on telemetry floor for following problems:
 
Acute on chronic diastolic CHF:
-Supplemental oxygen as needed to keep oxygen saturation above 92%
-Continue IV Lasix twice a day
-Daily weight
-Input and output
-Cardiology recommendations
-Echocardiogram
 
Right leg cellulitis:
-Continue Augmentin
-Leg elevation
 
History of A. fib:
-Continue Eliquis
 
History of diabetes:
-Accu-Cheks
-Continue insulin NovoLog according to sliding scale
-Continue Levemir 12 units twice a day subcutaneous
 
History of COPD/LILLIAM:
-Continue home medications
-Continue CPAP at nighttime
-Continue oxygen supplementation
 
DVT prophylaxis:
Mechanical and patient will be on Eliquis
 
CODE STATUS:
Full code
 
Problem List:
 1. Cellulitis
 
 2. CHF (congestive heart failure)
 
Pain Ratin
Pain Location:
NONE
Pain Goal: Remain pain free
Pain Plan:
PAIN PATHWAY
Tomorrow's Labs & Rationales:
CBC/BEP

## 2018-04-09 VITALS — SYSTOLIC BLOOD PRESSURE: 102 MMHG | DIASTOLIC BLOOD PRESSURE: 54 MMHG

## 2018-04-09 VITALS — DIASTOLIC BLOOD PRESSURE: 84 MMHG | SYSTOLIC BLOOD PRESSURE: 128 MMHG

## 2018-04-09 VITALS — SYSTOLIC BLOOD PRESSURE: 110 MMHG | DIASTOLIC BLOOD PRESSURE: 62 MMHG

## 2018-04-09 LAB
ABSOLUTE GRANULOCYTE CT: 6.6 /CUMM (ref 1.4–6.5)
BASOPHILS # BLD: 0 /CUMM (ref 0–0.2)
BASOPHILS NFR BLD: 0.3 % (ref 0–2)
EOSINOPHIL # BLD: 0.2 /CUMM (ref 0–0.7)
EOSINOPHIL NFR BLD: 1.9 % (ref 0–5)
ERYTHROCYTE [DISTWIDTH] IN BLOOD BY AUTOMATED COUNT: 23.5 % (ref 11.5–14.5)
GRANULOCYTES NFR BLD: 78.5 % (ref 42.2–75.2)
HCT VFR BLD CALC: 27.7 % (ref 37–47)
LYMPHOCYTES # BLD: 1 /CUMM (ref 1.2–3.4)
MCH RBC QN AUTO: 24.7 PG (ref 27–31)
MCHC RBC AUTO-ENTMCNC: 32.2 G/DL (ref 33–37)
MCV RBC AUTO: 76.9 FL (ref 81–99)
MONOCYTES # BLD: 0.6 /CUMM (ref 0.1–0.6)
PLATELET # BLD: 241 /CUMM (ref 130–400)
PMV BLD AUTO: 7.7 FL (ref 7.4–10.4)
RED BLOOD CELL CT: 3.61 /CUMM (ref 4.2–5.4)
WBC # BLD AUTO: 8.4 /CUMM (ref 4.8–10.8)

## 2018-04-09 NOTE — PN- CARDIOLOGY
Subjective
Subjective:
 
Resting comfortably in her chair.  Denies any dyspnea at rest.  Has not 
ambulated today.
 
Objective
Vital Signs and I&Os
Vital Signs
 
 
Date Time Temp Pulse Resp B/P B/P Pulse O2 O2 Flow FiO2
 
     Mean Ox Delivery Rate 
 
04/09 0910      92 Nasal 1.0L 
 
       Cannula  
 
04/09 0655 97.4 94 20 102/54  92 Nasal  
 
       Cannula  
 
04/09 0054  79    97   
 
04/09 0000       Nasal 1.0L 
 
       Cannula  
 
04/08 2234 97.1 91 20 132/60  93 Nasal  
 
       Cannula  
 
04/08 2214  89    97   
 
04/08 1926      98 Nasal 2.0L 
 
       Cannula  
 
04/08 1455 98.4 115 18 140/68  92   
 
 
 Intake & Output
 
 
 04/09 1600 04/09 0800 04/09 0000 04/08 1600 04/08 0800 04/08 0000
 
Intake Total  100 494 554 280 620
 
Output Total  300 800 250 250 200
 
Balance  -200 -306 304 30 420
 
       
 
Intake, IV   14 14  
 
Intake, Oral  100 480 540 280 620
 
Number      0
 
Bowel      
 
Movements      
 
Output, Urine  300 800 250 250 200
 
Patient   203 lb   
 
Weight      
 
 
 
Physical Exam:
 
General: no apparent distress. Alert.
Eyes: No obvious scleral icterus.
HEENT: No jugular venous distention or abnormal jugular venous pulsations.
Cardiovascular: Normal intensity S1/S2.  Irregular
Respiratory: Lungs clear to auscultation bilaterally.
Abdomen: Soft, nontender with no guarding or rebound tenderness.
Musculoskeletal: No clubbing or cyanosis noted; 1+ lower extremity edema
Skin: Warm
Neurologic: No gross focal deficits noted.
 
Current Medications:
 Current Medications
 
 
  Sig/[place]ez Start time  Last
 
Medication Dose Route Stop Time Status Admin
 
Albuterol Sulfate 3 ML TID 04/07 1600 AC 04/09
 
  INH   0908
 
Amoxicillin/ 500 MG BID 04/07 0015 AC 04/09
 
Clavulanate Potassium  PO   1136
 
Apixaban 2.5 MG BID 04/06 2329 AC 04/09
 
  PO   1136
 
Atorvastatin Calcium 40 MG 1700 04/07 1700 AC 04/08
 
  PO   1750
 
Budesonide/ 2 PUF BID 04/06 2330 AC 04/09
 
Formoterol Fumarate  INH   1148
 
Diphenhydramine HCl 25 MG ONCE ONE 04/09 0845 DC 04/09
 
  IV 04/09 0846  1137
 
Diphenhydramine HCl 25 MG ONCE ONE 04/08 1800 DC 04/08
 
  PO 04/08 1801  1759
 
Furosemide 40 MG BID 04/08 2200 AC 04/09
 
  IV   1136
 
Furosemide 40 MG DAILY 04/07 1000 DC 04/08
 
  IV   0831
 
Insulin Aspart 0 TIDAC 04/07 0800 AC 04/09
 
  SC   0819
 
Insulin Detemir 12 UNITS BID 04/06 2345 AC 04/09
 
  SC   0819
 
Levothyroxine Sodium 0.1 MG DAILY AC 04/07 0700 AC 04/09
 
  PO   0607
 
Nystatin 1 JUAN BID 04/07 0100 AC 04/09
 
  TOP   1100
 
Omeprazole 40 MG AT BEDTIME 04/07 2200 AC 04/08
 
  PO   2120
 
Oxycodone/ 2 TAB Q6-PRN PRN 04/07 1130 AC 04/09
 
Acetaminophen  PO   0819
 
 
 
 
Results
Last 48 Hrs of Labs/Mics:
 Laboratory Tests
 
04/09/18 0715:
Anion Gap 13, Estimated GFR 39  L, BUN/Creatinine Ratio 20.0, CBC w Diff NO MAN 
DIFF REQ, RBC 3.61  L, MCV 76.9  L, MCH 24.7  L, MCHC 32.2  L, RDW 23.5  H, MPV 
7.7, Gran % 78.5  H, Lymphocytes % 12.4  L, Monocytes % 6.9, Eosinophils % 1.9, 
Basophils % 0.3, Absolute Granulocytes 6.6  H, Absolute Lymphocytes 1.0  L, 
Absolute Monocytes 0.6, Absolute Eosinophils 0.2, Absolute Basophils 0
 
04/08/18 0654:
Anion Gap 15, Estimated GFR 36  L, BUN/Creatinine Ratio 19.3, CBC w Diff NO MAN 
DIFF REQ, RBC 3.71  L, MCV 76.5  L, MCH 25.0  L, MCHC 32.7  L, RDW 22.5  H, MPV 
7.9, Gran % 80.0  H, Lymphocytes % 12.5  L, Monocytes % 5.2, Eosinophils % 1.9, 
Basophils % 0.4, Absolute Granulocytes 7.0  H, Absolute Lymphocytes 1.1  L, 
Absolute Monocytes 0.5, Absolute Eosinophils 0.2, Absolute Basophils 0
 
04/07/18 1530:
Troponin I 0.02
 
Recent Imaging Studies:
 
Echo:
Normal left ventricular systolic function with moderate concentric  
 hypertrophy. Moderate left atrial enlargement. Mild dilated and  
 hypokinetic Right ventricle. Cannot estimate Pulmonary systolic  
 pressure. 
 
Telemetry tracings are personally reviewed and showed atrial fibrillation
 
Assessment/Plan
Assessment/Plan
 
1.  Chronic heart failure with preserved LV ejection fraction 
2.  Lower extremity edema with chronic venous insufficiency
3.  Chronic renal insufficiency
4.  Anemia
5.  Atrial fibrillation on chronic anticoagulation with Eliquis
6.  Coronary artery disease with prior CABG and subsequent PCI 2013
7.  COPD 
8.  Pulmonary hypertension with mild RV dysfunction 
 
 
Echocardiogram showed normal left ventricular ejection fraction with mild right 
ventricular global systolic dysfunction; would continue with the IV Lasix while 
monitoring strict i+o/ daily weights and metabolic panels. she remains in atrial
fibrillation with controlled ventricular response rate; continue with the 
eliquis. She reports no dyspnea at rest; would encourage ambulation today.
 
 
Salomón Avalos MD [facility]
Continue telemetry? No

## 2018-04-09 NOTE — PN- PULMONARY
Subjective
HPI/Critical Care Issues:
Resting comfortably in her chair.  Denies any dyspnea at rest.  Has not 
ambulated today.
 
Objective
Current Medications:
 Current Medications
 
 
  Sig/[place] Start time  Last
 
Medication Dose Route Stop Time Status Admin
 
Albuterol Sulfate 3 ML TID 04/07 1600 AC 04/09
 
  INH   1326
 
Amoxicillin/ 500 MG BID 04/07 0015 AC 04/09
 
Clavulanate Potassium  PO   1136
 
Apixaban 2.5 MG BID 04/06 2329 AC 04/09
 
  PO   1136
 
Atorvastatin Calcium 40 MG 1700 04/07 1700 AC 04/08
 
  PO   1750
 
Budesonide/ 2 PUF BID 04/06 2330 AC 04/09
 
Formoterol Fumarate  INH   1148
 
Diphenhydramine HCl 25 MG ONCE ONE 04/09 0845 DC 04/09
 
  IV 04/09 0846  1137
 
Diphenhydramine HCl 25 MG ONCE ONE 04/08 1800 DC 04/08
 
  PO 04/08 1801  1759
 
Furosemide 40 MG BID 04/08 2200 AC 04/09
 
  IV   1136
 
Furosemide 40 MG DAILY 04/07 1000 DC 04/08
 
  IV   0831
 
Insulin Aspart 0 TIDAC 04/07 0800 AC 04/09
 
  SC   1348
 
Insulin Detemir 14 UNITS BID 04/09 2200 AC 
 
  SC   
 
Insulin Detemir 12 UNITS BID 04/06 2345 DC 04/09
 
  SC   0819
 
Levothyroxine Sodium 0.1 MG DAILY AC 04/07 0700 AC 04/09
 
  PO   0607
 
Nystatin 1 JUAN BID 04/07 0100 AC 04/09
 
  TOP   1100
 
Omeprazole 40 MG AT BEDTIME 04/07 2200 AC 04/08
 
  PO   2120
 
Oxycodone/ 2 TAB Q6-PRN PRN 04/07 1130 AC 04/09
 
Acetaminophen  PO   0819
 
 
 
 
Vital Signs & I&O
Last 24 Hrs of Vitals and I&O:
 Vital Signs
 
 
Date Time Temp Pulse Resp B/P B/P Pulse O2 O2 Flow FiO2
 
     Mean Ox Delivery Rate 
 
04/09 0910      92 Nasal 1.0L 
 
       Cannula  
 
04/09 0655 97.4 94 20 102/54  92 Nasal  
 
       Cannula  
 
04/09 0054  79    97   
 
04/09 0000       Nasal 1.0L 
 
       Cannula  
 
04/08 2234 97.1 91 20 132/60  93 Nasal  
 
       Cannula  
 
04/08 2214  89    97   
 
04/08 1926      98 Nasal 2.0L 
 
       Cannula  
 
04/08 1455 98.4 115 18 140/68  92   
 
 
 Intake & Output
 
 
 04/09 1600 04/09 0800 04/09 0000
 
Intake Total  100 494
 
Output Total  300 800
 
Balance  -200 -306
 
    
 
Intake, IV   14
 
Intake, Oral  100 480
 
Output, Urine  300 800
 
Patient   203 lb
 
Weight   
 
 
 
 
Impression/Plan
 
Impression/Plan
Impression/Plan:
General Appearance Alert, Oriented X3, Cooperative, No Acute Distress
HEENT Atraumatic, PERRLA, EOMI, Mucous Membr. moist/pink
Neck Supple
Cardiovascular Normal S1, Normal S2
Lungs Normal Air Movement, minimal widespreas wheezes
Abdomen Normal Bowel Sounds, Soft, abd distension
Neurological Normal Speech, Strength at 5/5 X4 Ext, Normal Tone, Sensation 
Intact, Cranial Nerves 3-12 NL
Extremities bilateral 2 + pitting edema, right LE erythema is receeding from the
line marking done in the ED previously to monitor cellulitis
Vascular Normal Pulses
 
CXR mild chf
 
IMPRESSION
 
Ms Irvin is an 83-year-old woman with past medical history of atrial 
fibrillation on anticoagulation with NOAC, coronary artery disease with prior 
CABG and subsequent PCI in July of 2013, chronic heart failure with preserved 
ejection fraction, Secondary pulmonary hypertension, hypertension, 
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 
migraine, type 2 diabetes, hypothyroidism, anxiety 
 
* Now with improving  decompensated chf with rt more than left chf with 
preserved ef
* Pt has sig chronic bronchiolitis with airtrapping and has chronic hypoxia (
with preserved FEV1)
* Sig Clare using her cpap and compliant
* Chronic pedal edema with erythema, due to venous insuff from her vein 
harvesting from cabg
* pulm htn due to clare, diastolic heart, and bronchiolitis
* Morbid obesity/anxietyCKD/ Chronic anemia
 
REC
Cont cpap at hs
Diuresis ongoing
Cont oxygen and wean down to sat of 90 percent
Try to reduce narcotics
Cont levoxyl
Cont symbicort
Nebs only if wheezing
Lasix
Will follow prn ok to dc
pt to follow up with me upon dc

## 2018-04-09 NOTE — PN- CARDIOLOGY
Subjective
Subjective:
 
Error, duplicate note
 
Objective
Vital Signs and I&Os
Vital Signs
 
ERROR
 
 
Date Time Temp Pulse Resp B/P B/P Pulse O2 O2 Flow FiO2
 
     Mean Ox Delivery Rate 
 
04/09 0910      92 Nasal 1.0L 
 
       Cannula  
 
04/09 0655 97.4 94 20 102/54  92 Nasal  
 
       Cannula  
 
04/09 0054  79    97   
 
04/09 0000       Nasal 1.0L 
 
       Cannula  
 
04/08 2234 97.1 91 20 132/60  93 Nasal  
 
       Cannula  
 
04/08 2214  89    97   
 
04/08 1926      98 Nasal 2.0L 
 
       Cannula  
 
04/08 1455 98.4 115 18 140/68  92   
 
 
 Intake & Output
 
 
 04/09 1600 04/09 0800 04/09 0000 04/08 1600 04/08 0800 04/08 0000
 
Intake Total  100 494 554 280 620
 
Output Total  300 800 250 250 200
 
Balance  -200 -306 304 30 420
 
       
 
Intake, IV   14 14  
 
Intake, Oral  100 480 540 280 620
 
Number      0
 
Bowel      
 
Movements      
 
Output, Urine  300 800 250 250 200
 
Patient   203 lb   
 
Weight      
 
 
 
Physical Exam:
 
error, duplicate note
 
Current Medications:
 Current Medications
 
 
  Sig/[place] Start time  Last
 
Medication Dose Route Stop Time Status Admin
 
Albuterol Sulfate 3 ML TID 04/07 1600 AC 04/09
 
  INH   0908
 
Amoxicillin/ 500 MG BID 04/07 0015 AC 04/09
 
Clavulanate Potassium  PO   1136
 
Apixaban 2.5 MG BID 04/06 2329 AC 04/09
 
  PO   1136
 
Atorvastatin Calcium 40 MG 1700 04/07 1700 AC 04/08
 
  PO   1750
 
Budesonide/ 2 PUF BID 04/06 2330 AC 04/09
 
Formoterol Fumarate  INH   1148
 
Diphenhydramine HCl 25 MG ONCE ONE 04/09 0845 DC 04/09
 
  IV 04/09 0846  1137
 
Diphenhydramine HCl 25 MG ONCE ONE 04/08 1800 DC 04/08
 
  PO 04/08 1801  1759
 
Furosemide 40 MG BID 04/08 2200 AC 04/09
 
  IV   1136
 
Furosemide 40 MG DAILY 04/07 1000 DC 04/08
 
  IV   0831
 
Insulin Aspart 0 TIDAC 04/07 0800 AC 04/09
 
  SC   0819
 
Insulin Detemir 12 UNITS BID 04/06 2345 AC 04/09
 
  SC   0819
 
Levothyroxine Sodium 0.1 MG DAILY AC 04/07 0700 AC 04/09
 
  PO   0607
 
Nystatin 1 JUAN BID 04/07 0100 AC 04/09
 
  TOP   1100
 
Omeprazole 40 MG AT BEDTIME 04/07 2200 AC 04/08
 
  PO   2120
 
Oxycodone/ 2 TAB Q6-PRN PRN 04/07 1130 AC 04/09
 
Acetaminophen  PO   0819
 
 
 
 
Results
Last 48 Hrs of Labs/Mics:
 Laboratory Tests
 
04/09/18 0715:
Anion Gap 13, Estimated GFR 39  L, BUN/Creatinine Ratio 20.0, CBC w Diff NO MAN 
DIFF REQ, RBC 3.61  L, MCV 76.9  L, MCH 24.7  L, MCHC 32.2  L, RDW 23.5  H, MPV 
7.7, Gran % 78.5  H, Lymphocytes % 12.4  L, Monocytes % 6.9, Eosinophils % 1.9, 
Basophils % 0.3, Absolute Granulocytes 6.6  H, Absolute Lymphocytes 1.0  L, 
Absolute Monocytes 0.6, Absolute Eosinophils 0.2, Absolute Basophils 0
 
04/08/18 0654:
Anion Gap 15, Estimated GFR 36  L, BUN/Creatinine Ratio 19.3, CBC w Diff NO MAN 
DIFF REQ, RBC 3.71  L, MCV 76.5  L, MCH 25.0  L, MCHC 32.7  L, RDW 22.5  H, MPV 
7.9, Gran % 80.0  H, Lymphocytes % 12.5  L, Monocytes % 5.2, Eosinophils % 1.9, 
Basophils % 0.4, Absolute Granulocytes 7.0  H, Absolute Lymphocytes 1.1  L, 
Absolute Monocytes 0.5, Absolute Eosinophils 0.2, Absolute Basophils 0
 
04/07/18 1530:
Troponin I 0.02
 
 
Assessment/Plan
Assessment/Plan
 
Error, duplicate note
Continue telemetry? Yes
 
 
Telemetry tracings were personally reviewed and showed atrial fibrillation with 
ventricular pacing

## 2018-04-09 NOTE — PN- HOUSESTAFF
Mikey CALDERON,[facility] 18 0725:
Subjective
Follow-up For:
CHF
Cellulitis
?abscess of mid thigh
Subjective:
No acute events overnight. States swelling improved. Asking for benadryl for 
itching possible due to percocet last night.
 
Review of Systems
Constitutional:
Reports: see HPI. 
 
Objective
Last 24 Hrs of Vital Signs/I&O
 Vital Signs
 
 
Date Time Temp Pulse Resp B/P B/P Pulse O2 O2 Flow FiO2
 
     Mean Ox Delivery Rate 
 
0910      92 Nasal 1.0L 
 
       Cannula  
 
 0655 97.4 94 20 102/54  92 Nasal  
 
       Cannula  
 
 0054  79    97   
 
 0000       Nasal 1.0L 
 
       Cannula  
 
 2234 97.1 91 20 132/60  93 Nasal  
 
       Cannula  
 
 2214  89    97   
 
 1926      98 Nasal 2.0L 
 
       Cannula  
 
 1455 98.4 115 18 140/68  92   
 
 
 Intake & Output
 
 
  1600  0800 04 0000
 
Intake Total  100 494
 
Output Total  300 800
 
Balance  -200 -306
 
    
 
Intake, IV   14
 
Intake, Oral  100 480
 
Output, Urine  300 800
 
Patient   203 lb
 
Weight   
 
 
 
 
Physical Exam
General Appearance: Alert, Oriented X3, Cooperative
Cardiovascular: irregularly irregular
Lungs: basilar crackles
Abdomen: Normal Bowel Sounds, Soft, No Tenderness
Extremities: trace/1+ LE edema. slightly worst on R side
Vascular: 2+ radial pulses
Current Medications:
 Current Medications
 
 
  Sig/[place] Start time  Last
 
Medication Dose Route Stop Time Status Admin
 
Albuterol Sulfate 3 ML TID  1600 AC 
 
  INH   0908
 
Amoxicillin/ 500 MG BID  0015 AC 
 
Clavulanate Potassium  PO   2120
 
Apixaban 2.5 MG BID  2329 AC 
 
  PO   2120
 
Atorvastatin Calcium 40 MG 1700  1700 AC 
 
  PO   1750
 
Budesonide/ 2 PUF BID  2330 AC 
 
Formoterol Fumarate  INH   2121
 
Diphenhydramine HCl 25 MG ONCE ONE  0845 DC 
 
  IV  0846  
 
Diphenhydramine HCl 25 MG ONCE ONE  1800 DC 
 
  PO  1801  1759
 
Furosemide 40 MG BID  2200 AC 
 
  IV   2120
 
Furosemide 40 MG DAILY  1000 DC 
 
  IV   0831
 
Insulin Aspart 0 TIDAC  0800 AC 
 
  SC   0819
 
Insulin Detemir 12 UNITS BID  2345  
 
  SC   0819
 
Levothyroxine Sodium 0.1 MG DAILY   0700  
 
  PO   0607
 
Nystatin 1 JUAN BID  0100  
 
  TOP   2121
 
Omeprazole 40 MG AT BEDTIME  2200  
 
  PO   2120
 
Oxycodone/ 2 TAB Q6-PRN PRN  1130  
 
Acetaminophen  PO   0819
 
 
 
 
Last 24 Hrs of Lab/En Results
Last 24 Hrs of Labs/Mics:
 Laboratory Tests
 
18 0715:
Anion Gap 13, Estimated GFR 39  L, BUN/Creatinine Ratio 20.0, CBC w Diff NO MAN 
DIFF REQ, RBC 3.61  L, MCV 76.9  L, MCH 24.7  L, MCHC 32.2  L, RDW 23.5  H, MPV 
7.7, Gran % 78.5  H, Lymphocytes % 12.4  L, Monocytes % 6.9, Eosinophils % 1.9, 
Basophils % 0.3, Absolute Granulocytes 6.6  H, Absolute Lymphocytes 1.0  L, 
Absolute Monocytes 0.6, Absolute Eosinophils 0.2, Absolute Basophils 0
 
 
Assessment/Plan
Assessment:
83-year-old woman with past medical history of atrial fibrillation on 
anticoagulation with DOAC, coronary artery disease with prior quadruple CABG and
subsequent PCI in 2013, chronic heart failure with preserved ejection 
fraction, pulmonary hypertension, hypertension, hyperlipidemia, COPD on 2 L 
oxygen, migraine, type 2 diabetes, hypothyroidism, anxiety came to the hospital 
with a chief concern of recent weight gain of approximately 5-10 pounds in the 
last 1 week, worsening pedal edema, back pain.
 
Acute on chronic diastolic CHF:
Chest x-ray: suggestive of mild fluid overload. No pleural effusion or 
consolidation.
Echocardiogram: LVEF 65-70%.  Mild dilated and hypokinetic Right ventricle
 
-Continue IV Lasix
-Daily weight, Input and output
-Cardiology recommendations
-Continue oxygen, patient is on 2 L at baseline continuous
 
Right leg cellulitis:
Venous Doppler negative
-Continue Augmentin x10 days total
-Leg elevation
 
?R mid thigh fluid collection
US doppler: Nonspecific loculated the fluid collection mid depth anterior mid 
thigh 3 cm, raising concern for possible abscess
WBC improved
-cont to monitor
 
History of A. fib:
-Continue Eliquis
-Patient does not take metoprolol per family
 
History of diabetes:
-Accu-Cheks
-Continue insulin NovoLog according to sliding scale
-Increased Levemir 14 units twice a day subcutaneous
 
History of COPD/LILLIAM:
-cont pulmnoloy recs
-Continue home medications
-Continue CPAP at nighttime
-Continue oxygen supplementation
 
DVT prophylaxis:
cont Eliquis
 
CODE STATUS:
Full code
Problem List:
 1. Cellulitis
 
 2. CHF (congestive heart failure)
 
Pain Ratin
Pain Location:
none
Pain Goal: Pain 4 or less
Pain Plan:
pain pathway
Tomorrow's Labs & Rationales:
chidi Barker MD,[place] 18 1150:
Attending MD Review Statement
 
Attending Statement
Attending MD Statement: examined this patient, discuss w/resident/PA/NP, agreed 
w/resident/PA/NP, reviewed EMR data (avail), discussed with nursing, discussed 
with case mgmt
Attending Assessment/Plan:
Patient seen and examined.  This morning she is sitting in chair.  She feels a 
that she has improved and reports less difficulty breathing and decrease edema 
in legs.  Her vital signs are stable she's currently afebrile.  Her saturation 
is 92% on 1 L  of oxygen.  Chest exam shows minimal crackles at the bases.  
Bilateral leg edema is noted.  There is slight erythema of the lower anterior 
shin area which has decreased in intensity since admission.
 
Labs show white cell count of 8.4 and hematocrit of 27.7.  Sodium is 135 and 
creatinine is 1.3 with a BUN of 26.  These numbers are close to her baseline. 
 
Note that a recent the duplex scan had raised the suspicion for loculated fluid 
collection.  However this is not supported by clinical exam.
 
Assessment plan
Congestive heart failure with acute exacerbation
Right lower leg cellulitis
Bilateral edema
History of A. fib
Morbid obesity
COPD and obstructive sleep apnea
History of diabetes with uncontrolled glucose Accu-Chek consistently above 200
 
Plan
Continue Augmentin to complete 7-10 days for cellulitis
Continue IV Lasix today with plan to switch to oral tomorrow
Increase Levemir to 14 units twice a day
Plan for discharge home tomorrow

## 2018-04-09 NOTE — PATIENT DISCHARGE INSTRUCTIONS
Discharge Instructions
 
General Discharge Information
Special Instructions:
Please follow-up with your PCP 1 week.
 
Please follow-up with your cardiologist in 1 weeks.
 
Please follow-up with a pulmonologist in 1 week.  We have given you Dr. Izaguirre's 
information.
 
Please watch for any signs of worsening infection.
 
Please take your medications as prescribed.
 
Acute Coronary Syndrome
 
Inclusion Criteria
At DC or during hospital stay patient has or had the following:
ACS DIAGNOSIS No
 
Discharge Core Measures
Meds if any: Prescribed or Continued at Discharge
Meds if any: NOT Prescribed or Continued at Discharge
 
Congestive Heart Failure
 
Inclusion Criteria
At DC or during hospital stay patient has or had the following:
CHF DIAGNOSIS Yes
 
Discharge Core Measures
Meds if any: Prescribed or Continued at Discharge
Meds if any: NOT Prescribed or Continued at Discharge
 
Cerebrovascular accident
 
Inclusion Criteria
At DC or during hospital stay patient has or had the following:
CVA/TIA Diagnosis No
 
Discharge Core Measures
Meds if any: Prescribed or Continued at Discharge
Meds if any: NOT Prescribed or Continued at Discharge
 
Venous thromboembolism
 
Inclusion Criteria
VTE Diagnosis No
VTE Type NONE
VTE Confirmed by (Test) UNILATERAL VENOUS DOPPLER
 
Discharge Core Measures
- Per Current guidelines, there needs to be overlap
- treatment for the first 5 days of Warfarin therapy.
- If discharged on Warfarin prior to 5 days of
- overlap therapy, the patient will need to be
- assessed for post discharge needs including
- *Post discharge parental anticoagulation
- *Warfarin and/or parental anticoagulation education
- *Follow up date to check INR post discharge
At least 5 days overlap therapy as Inpatient No
Meds if any: Prescribed or Continued at Discharge
Note: Overlap Therapy is Warfarin and Anticoagulant
Meds if any: NOT Prescribed or Continued at Discharge

## 2018-04-10 VITALS — DIASTOLIC BLOOD PRESSURE: 74 MMHG | SYSTOLIC BLOOD PRESSURE: 144 MMHG

## 2018-04-10 VITALS — SYSTOLIC BLOOD PRESSURE: 130 MMHG | DIASTOLIC BLOOD PRESSURE: 60 MMHG

## 2018-04-10 VITALS — DIASTOLIC BLOOD PRESSURE: 80 MMHG | SYSTOLIC BLOOD PRESSURE: 128 MMHG

## 2018-04-10 NOTE — PN- HOUSESTAFF
**See Addendum**
Emanuel CALDERON,Sathish 04/10/18 0600:
Subjective
Follow-up For:
acute on chronic diastolic CHF, Cellulitis
Tele-Events Since Last Visit:
A fib HR 80s-120s, 5 beats NSVT overnight
Subjective:
Patient was seen and examined at bedside.  She was resting comfortably in chair.
 She had no acute events overnight.  She reports constipation secondary to 
opioid use.  She has no other complaints, and would like to be dischargedd soon.
 She denies any breath, chest pain, nausea, vomiting, fever, chills.
 
Review of Systems
Constitutional:
Denies: chills, fever. 
EENTM:
Reports: no symptoms. 
Cardiovascular:
Denies: chest pain, palpitations. 
Respiratory:
Denies: cough, short of breath. 
Gastrointestinal:
Reports: constipation. 
Genitourinary:
Reports: no symptoms. 
Musculoskeletal:
Reports: no symptoms. 
Skin:
Reports: rash. 
 
Objective
Last 24 Hrs of Vital Signs/I&O
 Vital Signs
 
 
Date Time Temp Pulse Resp B/P B/P Pulse O2 O2 Flow FiO2
 
     Mean Ox Delivery Rate 
 
04/10 0656 97.8 102 18 128/80  93   
 
04/10 0625  86    94   
 
04/10 0318  79    96   
 
04/10 0014  89    96   
 
04/10 0000       CPAP  
 
 2236 97.6 92 20 128/84  95   
 
 2216  99    96   
 
 1955      96 Nasal 1.0L 
 
       Cannula  
 
 1600       Nasal 1.0L 
 
       Cannula  
 
 1455 97.4 104 18 110/62  96   
 
 0910      92 Nasal 1.0L 
 
       Cannula  
 
 
 Intake & Output
 
 
 04/10 1600 04/10 0800 04/10 0000
 
Intake Total  200 214
 
Output Total  150 850
 
Balance  50 -636
 
    
 
Intake, IV   14
 
Intake, Oral  200 200
 
Output, Urine  150 850
 
Patient  187 lb 
 
Weight   
 
 
 
 
Physical Exam
General Appearance: Alert, Oriented X3, Cooperative, No Acute Distress
Skin Temp/Moisture Exam: Warm/Dry
Sepsis Skin Exam (color): Normal for Ethnicity
Cardiovascular: Normal S1, Normal S2, irregularly irregular
Lungs: Clear to Auscultation, Normal Air Movement
Abdomen: Normal Bowel Sounds, Soft, No Tenderness
Neurological: Normal Speech, Normal Tone, Sensation Intact
Extremities: Trace BLE edema, faint erythema of the distal RLE, no warmth
Current Medications:
 Current Medications
 
 
  Sig/[place] Start time  Last
 
Medication Dose Route Stop Time Status Admin
 
Albuterol Sulfate 3 ML TID PRN  1452 AC 
 
  INH   1952
 
Albuterol Sulfate 3 ML TID  1600 DC 
 
  INH   1326
 
Amoxicillin/ 500 MG BID  0015 AC 04/10
 
Clavulanate Potassium  PO   0800
 
Apixaban 2.5 MG BID  2329 AC 04/10
 
  PO   0800
 
Atorvastatin Calcium 40 MG 1700  1700 AC 
 
  PO   1712
 
Budesonide/ 2 PUF BID  2330 AC 04/10
 
Formoterol Fumarate  INH   0804
 
Diphenhydramine HCl 25 MG ONCE ONE  2245 DC 
 
  PO  2246  2239
 
Furosemide 40 MG BID  2200 AC 04/10
 
  IV   0802
 
Insulin Aspart 0 TIDAC  1700 AC 04/10
 
  SC   0801
 
Insulin Aspart 0 TIDAC  0800 DC 
 
  SC   1348
 
Insulin Detemir 14 UNITS BID  2200 AC 04/10
 
  SC   0801
 
Insulin Detemir 12 UNITS BID  2345 DC 
 
  SC   0819
 
Levothyroxine Sodium 0.1 MG DAILY AC  0700 AC 04/10
 
  PO   0616
 
Nystatin 1 JUAN BID  0100 AC 04/10
 
  TOP   0809
 
Omeprazole 40 MG AT BEDTIME  2200 AC 
 
  PO   2131
 
Oxycodone/ 2 TAB Q6-PRN PRN  1130 AC 
 
Acetaminophen  PO   2236
 
Patient Medication  1 ED ONE ONE 04/10 0830 [place] 
 
Teaching  ED 04/10 0831  
 
Polyethylene Glycol 17 GM DAILY 04/10 1000 AC 
 
  PO   
 
 
 
 
Last 24 Hrs of Lab/En Results
Last 24 Hrs of Labs/Mics:
 Laboratory Tests
 
04/10/18 0650:
Anion Gap 11, Estimated GFR 36  L, BUN/Creatinine Ratio 19.3
 
 
Assessment/Plan
Assessment:
Patient is an 83-year-old female with a PMH significant for atrial fibrillation,
CAD status post CABG and subsequent PCI and 2013, HFpEF, severe pulmonary 
hypertension, HTN, HLD, COPD on 2 L O2 at baseline, migraines, type II DM, 
hypothyroidism, anxiety who presented with complaints of weight gain, worsening 
pedal edema, back pain.
 
#Acute on chronic diastolic HF
Echo shows EF 6570 percent with mild dilated and hypokinetic right ventricle, 
CXR with central vascular prominence, no pleural effusion
-Plan to switch to oral furosemide and possible DC today
 
#Right lower extremity cellulitis with fluid collection of the right mid thigh
Doppler ultrasound negative for DVT, leukocytosis has improved
-Continue Augmentin for a total of 10 day course
 
#A. fib, rate currently controlled
-Continue Eliquis for anticoagulation
-restart metoprolol succinate at 25 mg
 
#COPD
-Taper down to O2
-Follow-up with Dr. Izaguirre as outpatient
 
#Chronic medical problems
-Continue home medications
-Increased Levemir to 14 units twice a day yesterday
-Continue Accu-Cheks and NovoLog sliding scale
 
Diet: CHF diet
DVT prophylaxis: Eliquis, Alps
CODE STATUS: Full code
Problem List:
 1. COPD (chronic obstructive pulmonary disease)
 
 2. CHF (congestive heart failure)
 
 3. Cellulitis
 
Pain Ratin
Pain Location:
none
Pain Goal: Remain pain free
Pain Plan:
pain pathway
Tomorrow's Labs & Rationales:
none
 
 
Bayron Ricnon MD 04/10/18 1122:
Attending MD Review Statement
 
Attending Statement
Attending MD Statement: examined this patient, discuss w/resident/PA/NP, agreed 
w/resident/PA/NP, reviewed EMR data (avail)
Attending Assessment/Plan:
Doing well today, no complaints.  Stable for discharge home.  Will discuss with 
cardiology.

## 2018-04-10 NOTE — PN- CARDIOLOGY
Subjective
Subjective:
Patient feels well.  She denies chest pain or shortness of breath.  She is 
anxious to go home.
Review of Systems:
Eyes no blurred or double vision
Ears no deafness or ringing
Nose and throat no recurrent sinusitis
Lungs per history of present illness
Heart per history of present illness
Abdomen no nausea vomiting
Musculoskeletal occasional muscle and joint pains
Psych no anxiety or depression
Neuro without recurrent headache or seizures
Endocrine no heat or cold intolerance
 
Objective
Vital Signs and I&Os
Vital Signs
 
 
Date Time Temp Pulse Resp B/P B/P Pulse O2 O2 Flow FiO2
 
     Mean Ox Delivery Rate 
 
04/10 0656 97.8 102 18 128/80  93   
 
04/10 0625  86    94   
 
04/10 0318  79    96   
 
04/10 0014  89    96   
 
04/10 0000       CPAP  
 
04/09 2236 97.6 92 20 128/84  95   
 
04/09 2216  99    96   
 
04/09 1955      96 Nasal 1.0L 
 
       Cannula  
 
04/09 1600       Nasal 1.0L 
 
       Cannula  
 
04/09 1455 97.4 104 18 110/62  96   
 
 
 Intake & Output
 
 
 04/10 1600 04/10 0800 04/10 0000 04/09 1600 04/09 0800 04/09 0000
 
Intake Total  200 214 480 100 494
 
Output Total  150 850 600 300 800
 
Balance  50 -636 -120 -200 -306
 
       
 
Intake, IV   14   14
 
Intake, Oral  200 200 480 100 480
 
Output, Urine  150 850 600 300 800
 
Patient  187 lb    203 lb
 
Weight      
 
 
 
Physical Exam:
Patient is a well-developed well-nourished female appearing in no acute distress
HEENT is unremarkable
Neck is supple there is no JVD
Lungs decreased air entry bilaterally 
Heart irregular rhythm S1 and S2 are normal no murmurs gallops or rubs
Abdomen bowel sounds positive
Extremities 2+ edema with erythema right 1+ edema left
Current Medications:
 Current Medications
 
 
  Sig/[place] Start time  Last
 
Medication Dose Route Stop Time Status Admin
 
Albuterol Sulfate 3 ML TID PRN 04/09 1452 AC 04/09
 
  INH   1952
 
Albuterol Sulfate 3 ML TID 04/07 1600 DC 04/09
 
  INH   1326
 
Amoxicillin/ 500 MG BID 04/07 0015 AC 04/10
 
Clavulanate Potassium  PO   0800
 
Apixaban 2.5 MG BID 04/06 2329 AC 04/10
 
  PO   0800
 
Atorvastatin Calcium 40 MG 1700 04/07 1700 AC 04/09
 
  PO   1712
 
Budesonide/ 2 PUF BID 04/06 2330 AC 04/10
 
Formoterol Fumarate  INH   0804
 
Diphenhydramine HCl 25 MG ONCE ONE 04/09 2245 DC 04/09
 
  PO 04/09 2246  2239
 
Furosemide 40 MG BID 04/08 2200 AC 04/10
 
  IV   0802
 
Insulin Aspart 0 TIDAC 04/09 1700 AC 04/10
 
  SC   0801
 
Insulin Aspart 0 TIDAC 04/07 0800 DC 04/09
 
  SC   1348
 
Insulin Detemir 14 UNITS BID 04/09 2200 AC 04/10
 
  SC   0801
 
Insulin Detemir 12 UNITS BID 04/06 2345 DC 04/09
 
  SC   0819
 
Levothyroxine Sodium 0.1 MG DAILY AC 04/07 0700 AC 04/10
 
  PO   0616
 
Nystatin 1 JUAN BID 04/07 0100 AC 04/10
 
  TOP   0809
 
Omeprazole 40 MG AT BEDTIME 04/07 2200 AC 04/09
 
  PO   2131
 
Oxycodone/ 2 TAB Q6-PRN PRN 04/07 1130 AC 04/09
 
Acetaminophen  PO   2236
 
Patient Medication  1 ED ONE ONE 04/10 0830 DC 
 
Teaching  ED 04/10 0831  
 
Polyethylene Glycol 17 GM DAILY 04/10 1000 AC 
 
  PO   
 
 
 
 
Results
Last 48 Hrs of Labs/Mics:
 Laboratory Tests
 
04/10/18 0650:
Anion Gap 11, Estimated GFR 36  L, BUN/Creatinine Ratio 19.3
 
04/09/18 0715:
Anion Gap 13, Estimated GFR 39  L, BUN/Creatinine Ratio 20.0, CBC w Diff NO MAN 
DIFF REQ, RBC 3.61  L, MCV 76.9  L, MCH 24.7  L, MCHC 32.2  L, RDW 23.5  H, MPV 
7.7, Gran % 78.5  H, Lymphocytes % 12.4  L, Monocytes % 6.9, Eosinophils % 1.9, 
Basophils % 0.3, Absolute Granulocytes 6.6  H, Absolute Lymphocytes 1.0  L, 
Absolute Monocytes 0.6, Absolute Eosinophils 0.2, Absolute Basophils 0
 
Telemetry personally reviewed atrial fibrillation with overall rapid ventricular
response
 
Assessment/Plan
Assessment/Plan
1.  Chronic heart failure with preserved LV ejection fraction 
2.  Lower extremity edema with chronic venous insufficiency
3.  Chronic renal insufficiency
4.  Anemia
5.  Atrial fibrillation with rapid ventricular response on chronic 
anticoagulation with Eliquis
6.  Coronary artery disease with prior CABG and subsequent PCI 2013
7.  COPD 
8.  Pulmonary hypertension with mild RV dysfunction 
 
Recommendations
1.  Would transition Lasix to by mouth
2.  Ambulate
3.  Since her blood pressure has now stabilized and she is found to have rapid 
atrial fibrillation would resume outpatient metoprolol succinate but at 25 mg 
daily instead of her usual 100 mg monitoring her blood pressure closely
4.  Continue Eliquis for stroke prevention
Continue telemetry? Yes

## 2018-04-11 NOTE — DISCHARGE SUMMARY
Visit Information
 
Visit Dates
Admission Date:
04/06/18
 
Discharge Date:
04/10/18
 
 
Hospital Course
 
Course
Attending Physician:
Bayron Rincon MD
 
Primary Care Physician:
Marty Zacarias MD
 
Consulting Request: 1 
   Consulting Specialty: Cardiology
Consulting Request: 2 
   Consulting Specialty: Pulmonary Disease
Hospital Course:
Patient is an 83-year-old female with a PMH significant for atrial fibrillation,
CAD status post CABG and subsequent PCI and 7/2013, HFpEF, severe pulmonary 
hypertension, HTN, HLD, COPD on 2 L O2 at baseline, migraines, type II DM, 
hypothyroidism, anxiety who presented to the [facility] ED with complaints
of weight gain, worsening pedal edema, back pain.
 
Vital signs on admission: Blood pressure 124/71, temperature 97.1, pulse 106, 
respiratory 22, pulse ox 95 on 2 L nasal cannula
Labs on admission: WBC 11.4, hemoglobin 9.1, hematocrit 29.5, platelet 283, 
sodium 136, chloride 93, BUN 28, creatinine 1.3, glucose 246, AST 17, ALT 22
 
#Acute on chronic diastolic HF
Cardiology was consulted. Echo shows EF 6570 percent with mild dilated and 
hypokinetic right ventricle, CXR with central vascular prominence, no pleural 
effusion.  Patient was aggressively diuresed with IV Lasix.  Her oxygen was 
initially increased from her baseline of 2 L, was able to be weaned down to 
baseline 2L discharge.  Her dyspnea significantly improved throughout her 
hospitalization, and her lower extremity swelling also improved with Lasix and 
leg elevation.
 
#Right lower extremity cellulitis with fluid collection of the right mid thigh
Patient presented with leukocytosis and erythematous rash on the right lower 
extremity.  She is afebrile throughout her hospitalization.  Doppler ultrasound 
negative for DVT.  She was treated with by mouth Augmentin and showed 
significant improvement of her erythema, she was discharged with a prescription 
to complete a ten-day course.
 
#A. fib
Patient's home dose of Eliquis was continued for anticoagulation.  Her blood 
pressure was borderline and certain instances early in her admission and her 
home metoprolol succinate 100 mg was initially held, it was restarted upon 
discharge at 25 mg, she is instructed to follow-up with her cardiologist Dr. WICHO Foote with the intention to uptitrate as tolerated.
 
#COPD
Pulmonology was consulted.  Patient was treated with total respiratory care with
nebulizations as needed.  CPAP was continued at night for LILLIAM.  
 
#Chronic medical problems including CKD stage 3b, DM, hypothyroidism, 
Patient was treated with NovoLog sliding scale and Levemir with Accu-Cheks 
before meals and before bedtime, other home medications were continued with the 
exception of metoprolol, see above.
 
Of note patient presented to the [facility] ED again on 4/12/18 for 
worsening shortness of breath.  She was observed on telemetry floor for 24 hours
before she was discharged.  At that time it was decided to resume her home dose 
of metoprolol succinate 100 mg daily her Lasix was also increased to her 
baseline home dose of 40 mg twice a day. 
Allergies:
Coded Allergies:
NO KNOWN ALLERGIES (11/26/13)
 
Significant Procedures:
LE Doppler US
FINDINGS:
Respiratory variation, normal compression and augmented flow are noted
throughout the lower extremity. The visualized common femoral vein,
superficial femoral vein, profunda femoral vein, popliteal vein and midcalf
peroneal and posterior tibial venous segments show no evidence of deep venous
thrombosis.
 
There is loculated the fluid collection in the anterior mid thigh measure 2.5
x 1.1 x 3 cm, nonspecific, raising concern for possible an abscess, seroma if
patient has prior surgery or hematoma.
 
IMPRESSION:
 
1. Normal triplex scan without evidence of deep venous thrombosis involving
the lower extremity.
2. Nonspecific loculated the fluid collection mid depth anterior mid thigh 3
cm, raising concern for possible abscess. May consider cross-sectional
imaging with contrast and/or drainage.
 
 
ECHO
 Left Ventricle 
 Normal size left ventricle. Left ventricular wall thickness  
 moderately increased. Normal left ventricular ejection fraction  
 estimated at 65-70%. 
 
 Right Ventricle 
 Mild right ventricular dilatation. Mildly reduced right ventricular  
 global systolic function. 
 
 Right Atrium 
 Mild right atrial dilatation. 
 
 Left Atrium 
 Moderate left atrial dilatation. 
 
 Mitral Valve 
 Mild mitral annular calcification. Trace to mild mitral  
 regurgitation. 
 
 Aortic Valve 
 Diffuse thickening (sclerosis) of the aortic valve cusps without  
 reduced excursion. 
 
 Tricuspid Valve 
 Tricuspid valve is normal in structure and function. Mild tricuspid  
 regurgitation. 
 
 Pulmonic Valve 
 Pulmonic valve not well visualized, grossly normal. 
 
 Pericardium 
 No pericardial effusion. 
 
 Great Vessels 
 Normal size aortic root. 
 
 CONCLUSIONS 
 Normal left ventricular systolic function with moderate concentric  
 hypertrophy. Moderate left atrial enlargement. Mild dilated and  
 hypokinetic Right ventricle. Cannot estimate Pulmonary systolic  
 pressure. 
 
CXR
FINDINGS:
Surgical clips overlie the mediastinum. Median sternotomy wires appear
intact. The lungs are well expanded. Central vascular prominence with mild
interstitial prominence. No pleural effusion or pneumothorax. No dense
consolidation. The cardiac silhouette is prominent. There is a calcified
aorta.
 
IMPRESSION:
Findings suggestive of mild fluid overload. No pleural effusion or
consolidation.
 
Disposition Summary
 
Disposition
Principal Diagnosis:
Acute on chronic diastolic CHF
Additional Diagnosis:
CKD stage 3b, RLE cellulitis, COPD, pulmonary hypertension, A. fib, DM, 
hypothyroidism, Anemia
Discharge Disposition: home health services
 
Discharge Instructions
 
General Discharge Information
Code Status: Full Code
Patient's Diet:
diabetic diet
Patient's Activity:
as tolerated
Follow-Up Instructions/Appts:
Follow-up with your PCP 1 week.
 
Follow-up with your cardiologist, Dr. Lopez within 1 weeks.
 
Follow-up with a Dr. Izaguirre, pulmonologist [facility] 1 week. 
 
Medications at Discharge
Discharge Medications:
Stop taking the following medications:
Oxycodone HCl/Acetaminophen (Oxycodone-Acetaminophen 5-325) 5 MG-325 MG TABLET 
ORAL 2 x Daily as needed as needed for PAIN Qty = 30
 
Amoxicillin/Potassium Clav (Augmentin 875-125 Tablet) 875 MG-125 MG TABLET ORAL 
TWICE DAILY Qty = 20
 
Continue taking these medications:
Albuterol Sulfate (Albuterol Sulfate) 2.5 MG/3 ML (0.083 %) VIAL.NEB
    1 Vial Inhale Solution EVERY 6 HOURS AS NEEDED as needed for SHORTNESS OF 
BREATH
    Days = 18
    Comments:
       Last Taken: 4/13/18
             Time: 2 PM
 
Fluticasone/Salmeterol (Advair 250-50 Diskus) 250 MCG-50 MCG/DOSE BLST.W.DEV
    1 Puff Inhale through mouth TWICE DAILY as needed for SHORTNESS OF BREATH
    Qty = 60
    Comments:
       Last Taken: 4/13/18
             Time: 11 AM
       SYMBICORT GIVEN
 
Isosorbide Mononitrate (Isosorbide Mononitrate ER) 60 MG TAB.ER.24H
    1 Tablet ORAL DAILY
    Qty = 90
    Comments:
       Last Taken:4/13/18
             Time:10 AM
 
Levothyroxine Sodium (Levothyroxine Sodium) 100 MCG TABLET
    1 Tablet ORAL DAILY BEFORE BREAKFAST
    Qty = 90
    Comments:
       Last Taken: 4/13/18
             Time: 6AM
 
Omeprazole (Omeprazole) 40 MG CAPSULE.DR
    1 Capsule ORAL Every night
    Qty = 30
    Comments:
       Last Taken: 4/12/18
             Time: 9PM
 
Simvastatin (Simvastatin*) 40 MG TABLET
    1 Tablet ORAL DAILY
    Qty = 90
    Comments:
       Last Taken: 4/13/18
             Time: 4PM
       PT GIVEN ATORVASTATIN
 
Gabapentin (Gabapentin) 300 MG CAPSULE
    1 Capsule ORAL Every night
    Qty = 30
    Comments:
       Last Taken:4/12/18
             Time:10 PM
 
Lorazepam (Lorazepam) 0.5 MG TABLET
    1 Tablet ORAL Every night as needed as needed for SLEEP
    Qty = 30
    Comments:
       NOT GIVEN IN HOSPITAL
 
Repaglinide (Prandin) 2 MG TABLET
    1 Tablet ORAL THREE TIMES DAILY
    Qty = 90
    Instructions:
       .
    Comments:
       NOT GIVEN IN HOSPITAL
 
Apixaban (Eliquis) 2.5 MG TABLET
    2.5 Milligram ORAL TWICE DAILY
    Qty = 120
    Instructions:
       .
    Comments:
       Last Taken: 4/13/18
             Time: 10 AM
 
Furosemide (Lasix) 40 MG TABLET
    1 Tablet ORAL DAILY
    Comments:
       Last Taken: 4/10/18
             Time: 8AM
       PT GIVEN IV 40MG
 
Insulin Detemir (Levemir Flextouch) 100 UNIT/ML (3 ML) INSULN.PEN
    34 Units Inject into fatty tissue DAILY
    Comments:
       Last Taken:
             Time:NOT GIVEN IN HOSPITAL
 
Start taking the following new medications:
Augmentin (Augmentin 500-125 Tablet) 500 MG-125 MG TABLET
    1 Tablet ORAL TWICE DAILY
    Qty = 4
    No Refills
    Instructions:
       .
    Comments:
       Last Taken: 4/10/18
             Time: 8AM
 
Nystatin (Nystatin) 100,000 UNIT/GRAM CREAM..G.
    1 Application On the skin TWICE DAILY
    Qty = 1
    No Refills
    Instructions:
       TO AFFECTED AREAS 
    Comments:
       Last Taken:4/13/18
             Time:1 PM
 
Ferrous Sulfate (Ferrous Sulfate) 325 MG (65 MG IRON) TABLET
    1 Tablet ORAL DAILY
    Qty = 30
    No Refills
    Comments:
       NOT GIVEN IN HOSPITAL
 
Metoprolol Succ XL (Toprol XL) 25 MG TAB
    1 Tablet ORAL DAILY
    Qty = 30
    No Refills
    Comments:
       Last Taken: 4/10/18
             Time: 4PM
 
Polyethylene Glycol 3350 (Miralax) 17 GRAM POWD.PACK
    1 Packet ORAL DAILY
    Qty = 30
    No Refills
    Instructions:
       dissolve in water
    Comments:
       NOT GIVEN IN HOSPITAL
 
 
Copies To:
Lisa CALDERON,Marcin PATEL; Rell CALDERON,Marty QUEEN; Dmitriy CALDERON,Robert GOODMAN

## 2018-04-12 ENCOUNTER — HOSPITAL ENCOUNTER (OUTPATIENT)
Dept: HOSPITAL 68 - ERH | Age: 83
Setting detail: OBSERVATION
LOS: 1 days | Discharge: HOME HEALTH | End: 2018-04-13
Attending: INTERNAL MEDICINE | Admitting: INTERNAL MEDICINE
Payer: COMMERCIAL

## 2018-04-12 VITALS — SYSTOLIC BLOOD PRESSURE: 136 MMHG | DIASTOLIC BLOOD PRESSURE: 64 MMHG

## 2018-04-12 VITALS — WEIGHT: 214 LBS | HEIGHT: 61 IN | BODY MASS INDEX: 40.4 KG/M2

## 2018-04-12 DIAGNOSIS — I48.91: ICD-10-CM

## 2018-04-12 DIAGNOSIS — E03.9: ICD-10-CM

## 2018-04-12 DIAGNOSIS — R63.5: ICD-10-CM

## 2018-04-12 DIAGNOSIS — Z79.01: ICD-10-CM

## 2018-04-12 DIAGNOSIS — Z95.1: ICD-10-CM

## 2018-04-12 DIAGNOSIS — J44.9: ICD-10-CM

## 2018-04-12 DIAGNOSIS — I50.22: Primary | ICD-10-CM

## 2018-04-12 DIAGNOSIS — I25.10: ICD-10-CM

## 2018-04-12 LAB
ABSOLUTE GRANULOCYTE CT: 7.6 /CUMM (ref 1.4–6.5)
BASOPHILS # BLD: 0 /CUMM (ref 0–0.2)
BASOPHILS NFR BLD: 0.3 % (ref 0–2)
EOSINOPHIL # BLD: 0.2 /CUMM (ref 0–0.7)
EOSINOPHIL NFR BLD: 2.6 % (ref 0–5)
ERYTHROCYTE [DISTWIDTH] IN BLOOD BY AUTOMATED COUNT: 23.7 % (ref 11.5–14.5)
GRANULOCYTES NFR BLD: 81.2 % (ref 42.2–75.2)
HCT VFR BLD CALC: 28.8 % (ref 37–47)
LYMPHOCYTES # BLD: 0.9 /CUMM (ref 1.2–3.4)
MCH RBC QN AUTO: 24.5 PG (ref 27–31)
MCHC RBC AUTO-ENTMCNC: 31.7 G/DL (ref 33–37)
MCV RBC AUTO: 77.3 FL (ref 81–99)
MONOCYTES # BLD: 0.6 /CUMM (ref 0.1–0.6)
PLATELET # BLD: 260 /CUMM (ref 130–400)
PMV BLD AUTO: 7.6 FL (ref 7.4–10.4)
RED BLOOD CELL CT: 3.73 /CUMM (ref 4.2–5.4)
WBC # BLD AUTO: 9.4 /CUMM (ref 4.8–10.8)

## 2018-04-12 PROCEDURE — G0378 HOSPITAL OBSERVATION PER HR: HCPCS

## 2018-04-12 NOTE — RADIOLOGY REPORT
EXAMINATION:
XR CHEST
 
CLINICAL INFORMATION:
Weight gain. Dyspnea.
 
COMPARISON:
Chest x-ray 04/06/2018, 04/02/2018, 10/31/2017
 
TECHNIQUE:
2 views of the chest were obtained.
 
FINDINGS:
The heart size is significantly enlarged. This is unchanged since prior
study. There is vascular calcifications of the wall of aorta. Patient's had
prior median sternotomy.
 
The central hilar vessels are mildly prominent but unchanged since prior
chest x-rays. There is no interstitial edema or overt pulmonary edema. There
is no pleural effusion.
There is no dense consolidation.
 
IMPRESSION:
Persistent cardiomegaly and mildly prominent central hilar pulmonary vessels
unchanged since prior studies without overt pulmonary edema.

## 2018-04-12 NOTE — HISTORY & PHYSICAL
Emanuel CALDERON,Sathish 04/12/18 1328:
General Information and HPI
MD Statement:
I have seen and personally examined ENA IRVIN and documented this H&P.
 
The patient is a 83 year old F who presented with a patient stated chief 
complaint of [CHF exacerbation].
 
Source of Information: patient, family, old records
Exam Limitations: no limitations
History of Present Illness:
Patient is an 83-year-old female with a PMH significant for CAD status post CABG
in 2001 with stent placement in 2013, A. fib on Eliquis, hypothyroidism, COPD on
2 L O2 at baseline, who was recently the [facility] for CHF exacerbation 
who comes in to the ED complaining of significant weight gain, and worsening 
dyspnea on exertion.  She was discharged from [facility] 2 days prior to 
presentation.  Patient was weighs herself daily and states that this morning she
has gained 10 pounds, from 204 to 214 pounds in 1 day.  She has reported 
significant dyspnea with minimal exertion, from the walk to the restroom at home
she was very short of breath.  Although she was discharged on a low dose of 
Lasix, 40 mg daily, she had increased to 40 mg twice a day.  She also had to use
increased uptake at home, 3 L up from 2.  She noted worsening of her lower 
extremity edema and waist circumference.  At baseline she states her right leg 
is more swollen than her left secondary to harvesting of the right leg pain for 
her CABG.  She denies any chest pain, palpitations, lightheadedness, syncope.  
Patient follows with Dr. Marcin Lopez.
 
Allergies/Medications
Allergies:
Coded Allergies:
NO KNOWN ALLERGIES (11/26/13)
 
 
Past History
 
Travel History
Traveled to [place] past 21 day No
 
Medical History
Neurological: migraine (remote history), NEUROPATHY
EENT: NONE
Cardiovascular: AFIB, angina, CHF, hypertension, hyperlipidemia, "MULTIPLE MI'S
" QUAD BYPASS
Respiratory: COPD
Gastrointestinal: GERD
Hepatic: NONE
Renal: NONE
Musculoskeletal: NONE
Psychiatric: anxiety
Endocrine: diabetes, HYPOTHYROID
Blood Disorders: NONE
Cancer(s): NONE
History of MRSA: No
History of VRE: No
History of CDIFF: No
Influenza Vaccine: 10/01/17
 
Surgical History
Surgical History: appendectomy, arthroscopy, CABG, cholecystectomy, hysterectomy
, thyroid lobectomy rotator cuff repair
 
Past Family/Social History
 
Family History
Relations & Conditions if any
FATHER
  FH: CABG (coronary artery bypass surgery)
  FH: diabetes mellitus
  FH: HTN (hypertension)
  FH: kidney disease
  FH: stroke
MOTHER
  FH: myocardial infarction
BROTHER
  FH: heart disease
SISTER
  FH: COPD (chronic obstructive pulmonary disease)
  FH: heart disease
 
 
Psychosocial History
Where do you live? Home
Who Do You Live With? child
Services at Home: Nursing
Primary Language: English
Smoking Status: Never Smoked (significant 2nd hand exposure)
ETOH Use: denies use
Illicit Drug Use: denies illicit drug use
Living Will? yes
 
Functional Ability
Ambulation: walker
 
Review of Systems
 
Review of Systems
Constitutional:
Denies: chills, fever. 
EENTM:
Reports: no symptoms. 
Cardiovascular:
Reports: edema, orthopena, peripheral edema.  Denies: chest pain, palpitations, 
syncope. 
Respiratory:
Reports: short of breath.  Denies: cough. 
GI:
Reports: no symptoms. 
Genitourinary:
Reports: no symptoms. 
Musculoskeletal:
Reports: no symptoms. 
Skin:
Reports: no symptoms. 
Neurological/Psychological:
Reports: no symptoms. 
Hematologic/Endocrine:
Reports: no symptoms. 
 
Exam & Diagnostic Data
Last 24 Hrs of Vital Signs/I&O
 Vital Signs
 
 
Date Time Temp Pulse Resp B/P B/P Pulse O2 O2 Flow FiO2
 
     Mean Ox Delivery Rate 
 
04/12 1617 97.3 93 22 129/58  96 Nasal 2.0L 
 
       Cannula  
 
04/12 1458       Nasal  
 
       Cannula  
 
04/12 1137 97.5 88 22 122/63  96 Nasal 3.0L 
 
       Cannula  
 
 
 Intake & Output
 
 
 04/12 1600 04/12 0800 04/12 0000
 
Intake Total   
 
Output Total   
 
Balance   
 
    
 
Patient 214 lb  
 
Weight   
 
Weight Reported by Patient  
 
Measurement   
 
Method   
 
 
 
 
Physical Exam
General Appearance Alert, Oriented X3, Cooperative, No Acute Distress
Skin Temp/Moisture Exam: Warm/Dry
Sepsis Skin Exam (color): Normal for Ethnicity
HEENT Atraumatic, PERRLA, EOMI
Cardiovascular Regular Rate, Normal S1, Normal S2
Lungs bibasilar crackles
Abdomen Normal Bowel Sounds, Soft, No Tenderness
Neurological Normal Speech, Strength at 5/5 X4 Ext, Sensation Intact, Cranial 
Nerves 3-12 NL
Extremities No Clubbing, No Cyanosis, 2+ edema of the LEs bilaterally R>L which 
is the baseline
Body Front and Back (Adult)
 
  1) 
  2) pressure sore
Last 24 Hrs of Labs/En:
 Laboratory Tests
 
04/12/18 1940:
Troponin I 0.02
 
04/12/18 1254:
Anion Gap 14, Estimated GFR 36  L, BUN/Creatinine Ratio 20.7, Glucose 212  H, 
Calcium 8.8, Total Bilirubin 0.8, AST 18, ALT 21, Alkaline Phosphatase 97, 
Troponin I 0.02, Pro-B-Natriuretic Pept 1730  H, Total Protein 7.0, Albumin 4.0,
Globulin 3.0, Albumin/Globulin Ratio 1.3, CBC w Diff NO MAN DIFF REQ, RBC 3.73  
L, MCV 77.3  L, MCH 24.5  L, MCHC 31.7  L, RDW 23.7  H, MPV 7.6, Gran % 81.2  H,
Lymphocytes % 9.8  L, Monocytes % 6.1, Eosinophils % 2.6, Basophils % 0.3, 
Absolute Granulocytes 7.6  H, Absolute Lymphocytes 0.9  L, Absolute Monocytes 
0.6, Absolute Eosinophils 0.2, Absolute Basophils 0
 
 
Diagnostic Data
CXR Results
The heart size is significantly enlarged. This is unchanged since prior
study. There is vascular calcifications of the wall of aorta. Patient's had
prior median sternotomy.
 
The central hilar vessels are mildly prominent but unchanged since prior
chest x-rays. There is no interstitial edema or overt pulmonary edema. There
is no pleural effusion.
There is no dense consolidation.
 
IMPRESSION:
Persistent cardiomegaly and mildly prominent central hilar pulmonary vessels
unchanged since prior studies without overt pulmonary edema.
 
Assessment/Plan
Assessment:
Patient is an 83-year-old female with a PMH significant for CAD status post CABG
in 2001 with stent placement in 2013, A. fib on Eliquis, hypothyroidism, COPD on
2 L O2 at baseline, who was recently the [facility] for CHF exacerbation 
who comes in to the ED complaining of significant weight gain, and worsening 
dyspnea on exertion.
 
Signs on admission: T 97.5, P 88, RR 22, BP 1-2/60, pulse ox 93% on 3 L O2
Labs: WBC 9.4, H/H9.1/28.8, platelets 260, chloride 91, CO2 31, BUN 29, 
creatinine 1.4, glucose 212, proBNP 1730, troponin 0.02
 
Active problem list
#CHF exacerbation, with significant weight since discharge 2 days ago
#Uncontrolled diabetes with hyperglycemia
#Chronic medical problems
 
Plan
-place in obs for tele
-Continuous telemetry monitoring
-IV Lasix 40 mg twice a day, monitor BEP daily
-Strict I's and O's and daily weights
-Cardiology consult placed with answering service of Dr. Marcin Lopez
-Continue home medications
-NovoLog sliding scale and Accu-Cheks 3 times a day before meals and at bedtime
-TRC/nebs
 
Diet: Diabetic diet, sodium restricted
DVT prophylaxis: Eliquis, Alps
CODE STATUS: Full code
 
 
As Ranked By This Provider
Problem List:
 1. CHF exacerbation
   Qualifiers
 Heart failure type: unspecified Qualified Code: I50.9 - Heart failure, 
unspecified
 
 
Observation Initial Note
-
I have personally examined ENA IRVIN on 04/13/18 at 0722.
 
The disposition of ENA IRVIN is uncertain at this time and before a 
determination can be made, she requires a period of observation for the 
following reasons [CHF exacerbation] 
 
 
Core Measures/Misc (9/17)
 
Acute Coronary Syndrome
ACS Diagnosis: No
 
Congestive Heart Failure
Congestive Heart Failure Diagnosis Yes
Last Known EF % 65
 
Cerebrovascular Accident
CVA/TIA Diagnosis: No
 
VTE (View Protocol)
VTE Risk Factors Obesity
No Mechanical VTE Prophylaxis d/t N/A MechProphylax Ordered
No VTE Pharm Prophylaxis d/t NA PharmProphylax ordered
 
Sepsis (View protocol)
Sepsis Present: No
 
Cinda CALDERON,[facility] 04/12/18 1751:
General Information and HPI
 
Allergies/Medications
Home Med list
Albuterol Sulfate 2.5 MG/3 ML (0.083 %) VIAL.NEB   1 Vial INH/SOL Q6-PRN PRN 
SHORTNESS OF BREATH  (Reported)
Apixaban (Eliquis) 2.5 MG TABLET   2.5 MG PO BID ATRIAL FIBRILLATION
     .
Ferrous Sulfate 325 MG (65 MG IRON) TABLET   1 TAB PO DAILY IRON, VITAMIN
Fluticasone/Salmeterol (Advair 250-50 Diskus) 250 MCG-50 MCG/DOSE BLST.W.DEV   1
PUF INH BID PRN SHORTNESS OF BREATH  (Reported)
Furosemide (Lasix) 40 MG TABLET   40 MG PO 7:30 AM, & 4:30 PM CHF/Venous 
insufficiency
     .
Gabapentin 300 MG CAPSULE   1 CAP PO QPM SLEEP  (Reported)
Insulin Detemir (Levemir Flextouch) 100 UNIT/ML (3 ML) INSULN.PEN   34 U SC 
DAILY DIABETES  (Reported)
Isosorbide Mononitrate (Isosorbide Mononitrate ER) 60 MG TAB.ER.24H   1 TAB PO 
DAILY HEART  (Reported)
Levothyroxine Sodium 100 MCG TABLET   1 TAB PO DAILY AC THYROID  (Reported)
Lorazepam 0.5 MG TABLET   1 TAB PO QPMP PRN SLEEP  (Reported)
Metolazone 2.5 MG TABLET   1 TAB PO DAILY PRN Water retention
     .
Metoprolol Succ XL (Toprol XL) 100 MG TAB.ER.24H   1 TAB PO DAILY HTN
     .
Nystatin 100,000 UNIT/GRAM CREAM..G.   1 JUAN TOP BID FUNGAL RASH
     TO AFFECTED AREAS 
Omeprazole 40 MG CAPSULE.DR   1 CAP PO QPM ACID REFLUX  (Reported)
Oxycodone HCl/Acetaminophen (Oxycodone-Acetaminophen 5-325) 5 MG-325 MG TABLET  
1-2 TAB PO Q6P PRN PAIN  (Reported)
Polyethylene Glycol 3350 (Miralax) 17 GRAM POWD.PACK   1 PAC PO DAILY 
CONSTIPATION
     dissolve in water
Repaglinide (Prandin) 2 MG TABLET   1 TAB PO TID DIABETES
     .
Simvastatin (Simvastatin*) 40 MG TABLET   1 TAB PO DAILY CHOLESTEROL  (Reported)
 
 
 
Resident Review Statement
Resident Statement: examined this patient, discussed with intern
Other Findings:
Ms Irvin is pleasant 82-year-old woman with past history significant for 
coronary artery disease status post CABG in 2001 and stent placement in 2013, 
hypertension, A. fib on Xarelto, COPD on 2 L of home oxygen history of skin 
cancer brought in by family due to weight gain which occured over a 48 hour 
period.  Patient was recently discharged from [facility] on 4/10/2018.  
States that this morning when she woke up and after weighing herself on the same
scale 24 hours later she noted her weight gone up by 10 pounds to 214 (from 
204lbs).  She also reported significant dyspnea with minimal exertion.  Patient 
stated that she was taking 40 mg twice a day despite being recommended that she 
should take 40 mg of Lasix daily.  Daughter also reported that patient's O2 use 
had increased by a liter.  
DUring our clinical interaction she denied any fever, chills, nausea, vomiting. 
 
 
Cardiologist is Dr Lopez. 
 
She did endorse some dyspnea and some left shoulder pain.  She also endorsed 
pain in her buttock area.
 
R
See Above. 
 
 
E temperature 97.5.  Pulse 88.  Respirations 22.  Blood pressure 122/62.  
Saturation 96% on nasal cannula 3 L.
General Appearance: well developed/nourished, no apparent distress, alert, 
awake. Son Present in room
Head: atraumatic, normal appearance
Eyes:
Bilateral: normal appearance. 
Ears, Nose, Throat: hearing grossly normal
Neck: normal inspection, supple, full range of motion
Respiratory: Crackels notable in lower lung bases 
Cardiovascular: irregularly irregular
Peripheral Pulses:
2+ radial (R), 2+ radial (L)
Gastrointestinal: normal bowel sounds, non-tender, no organomegaly, mild 
firmness
Extremitie3+ pitting edema bilaterally
Neurologic/Psych: awake, alert, oriented x 3
Skin: intact, normal color, warm/dry, Stage one Ulcer on Right Buttock. 
 
 
Labs 
As Above. 
 
 
I
XRY-CHEST XRAY, TWO VIEWS
IMPRESSION:
Persistent cardiomegaly and mildly prominent central hilar pulmonary vessels
unchanged since prior studies without overt pulmonary edema.
 
A/P
 
82-year-old woman with past history significant for coronary artery disease 
status post CABG in 2001 and stent placement in 2013, hypertension, A. fib on 
Xarelto, COPD on 2 L of home oxygen history of skin cancer brought in by by 
daughter for evaluation of weight gain 
 
Acute decompensated CHF.
History of Atrial fibrillation on chronic anticoagulation with Eliquis
COPD on 2 L nasal cannula. 
CKD
 
Continue IV Lasix 40 MG.   
May consider addition of a different class of diuretic non-loop diuretic to 
increase medication efficacy.
Follow BEP and renal function daily 
Monitor ins and outs and daily weights.  . 
Cardiogenic consultation in am. 
Fingersticks and Accuchecks.  
Conitnue all other home medications
DVT prophylaxis with EliquAlice Gooden 04/13/18 0222:
Attending MD Review Statement
 
Attending Statement
Attending MD Statement: examined this patient, discuss w/resident/PA/NP, agreed 
w/resident/PA/NP, reviewed EMR data (avail), reviewed images, amended to note
Attending Assessment/Plan:
 
CC: Weight gain
PMH: A. fib, COPD on 2 L nasal cannula, heart failure with preserved ejection 
fraction, CAD S/P CABG and PCI, pulmonary hypertension, DM, history of thyroid 
tumor S/P radio ablation, now hypothyroidism, chronic back pain, CKD 
 
Patient was recently discharged from hospital on April 10, was admitted for 
heart failure exacerbation, received IV diuresis, symptomatically improved but 
patient was discharged on Lasix 40 mg daily. Patient's daughter increased the 
dose of Lasix to 40 mg 2 times a day on her own on 11th but overnight patient's 
weight increased by 10 pounds. Patient weighs herself daily, very meticulous 
with her diet control, no chest pain, cough, chest tightness, upper respiratory 
symptoms. She has been urinating less than expected and her oxygen level by 
nasal cannula had to be increased at home. She completed antibiotics for 
cellulitis.
 
Vitals: Afebrile, pulse 88, RR 22, blood pressure 122/63, saturating 96% on 2 L 
nasal cannula
On exam: A O 3, cooperative, in mild respiratory distress, neck supple, JVD 
difficult to assess, no lymphadenopathy, mucosa moist, complete neuro exam 
unremarkable, +3 leg edema right more than left, face is puffy compared to when 
I admitted her last time. Scar of previous vein grafting on the right side, 
decubitus ulcer healing, and right buttock CVS: S1-S2, irregular. RS: Bibasilar 
fine crackles. Abdomen: Soft, NT, distended, fluid thrill present, bowel sounds 
present. Peripheral pulses perfusion normal. mild tenderness on right side lower
back
 
CXR: Persistent cardiomegaly and mildly prominent central hilar pulmonary 
vessels
unchanged since prior studies without overt pulmonary edema.
 
Assessment and plan
 
83 year old female with extensive past medical history presented in ER for 10 
pound weight gain over night, increased leg swelling, increased abdominal girth,
increasing oxygen requirement at home secondary to dyspnea. Patient was admitted
from April 6 - April 10 for heart failure exacerbation, diuresed aggressively. 
She had significant weight loss at that time from 92 kg to 84 kg but now again 
gained weight after discharge. JVD could not be assessed as she cannot lay down 
from recliner, minimum bibasilar crackles, +3 pitting tense edema present more 
than previous hospitalization, abdomen is distended and fluid thrill present, 
face appears puffy as compared to her previous hospitalization. Chest x-ray does
not show overt pulmonary edema but has vascular congestion similar to previous. 
Patient's creatinine is stable. She appears to have acute on chronic heart 
failure, needing hospitalization for IV diuretic. Discussed possibility of 
following up with the heart failure clinic after discharge but patient currently
gets weekly labs drawn at home, adjustments of Lasix dose at home which will 
have similar outcomes. Patient is reluctant to go to heart failure clinic 
because of the access issues (she does not want to be burdensome on her daughter
to drive her all places of consultants and clinics). She completed by mouth 
antibiotics for cellulitis. She has very small decubitus ulcer on the right 
buttock. We will involve cardiology, consider changing her Lasix to torsemide or
bumetanide. Patient may be developing diuretic resistance, eventually adding 
HCTZ on metolazone could be helpful but have to be cautious with her CKD. 
 
+ Acute on chronic heart failure with preserved ejection fraction
+ Hx of A. fib, COPD on 2 L nasal cannula, CAD S/P CABG and PCI, pulmonary 
hypertension, DM, hypothyroidism, chronic back pain, CKD 
 
- Place in observation on telemetry
- Continuous telemetry monitoring
- Continue O2 by nasal cannula, try to wean to her home level
- IV Lasix 40 mg repeat in a.m., reassess for volume status, patient may require
Lasix twice a day IV
- Strict I's and O's
- Daily weights
- Cardiology consult in a.m.
- DVT prophylaxis 
- OT PT evaluation
- Continue sliding scale insulin, hold other antidiabetic
- Continue rest of her home medications

## 2018-04-12 NOTE — ED DYSPNEA/ASTHMA COMPLAINT
History of Present Illness
 
General
Chief Complaint: General Adult
Stated Complaint: GAINED 10 LBS IN 1 NIGHT, HX OF CHF
Source: patient, family, old records
Exam Limitations: no limitations
Allergies
Coded Allergies:
NO KNOWN ALLERGIES (13)
 
Reconcile Medications
Albuterol Sulfate 2.5 MG/3 ML (0.083 %) VIAL.NEB   1 Vial INH/SOL Q6-PRN PRN 
SHORTNESS OF BREATH  (Reported)
Apixaban (Eliquis) 2.5 MG TABLET   2.5 MG PO BID ATRIAL FIBRILLATION
     .
Augmentin (Augmentin 500-125 Tablet) 500 MG-125 MG TABLET   1 TAB PO BID 
CELLULITIS
     .
Ferrous Sulfate 325 MG (65 MG IRON) TABLET   1 TAB PO DAILY IRON, VITAMIN
Fluticasone/Salmeterol (Advair 250-50 Diskus) 250 MCG-50 MCG/DOSE BLST.W.DEV   1
PUF INH BID PRN SHORTNESS OF BREATH  (Reported)
Furosemide (Lasix) 40 MG TABLET   1 TAB PO DAILY WATER RETENTION  (Reported)
Gabapentin 300 MG CAPSULE   1 CAP PO QPM SLEEP  (Reported)
Insulin Detemir (Levemir Flextouch) 100 UNIT/ML (3 ML) INSULN.PEN   34 U SC 
DAILY DIABETES  (Reported)
Isosorbide Mononitrate (Isosorbide Mononitrate ER) 60 MG TAB.ER.24H   1 TAB PO 
DAILY HEART  (Reported)
Levothyroxine Sodium 100 MCG TABLET   1 TAB PO DAILY AC THYROID  (Reported)
Lorazepam 0.5 MG TABLET   1 TAB PO QPMP PRN SLEEP  (Reported)
Metoprolol Succ XL (Toprol XL) 25 MG TAB   1 TAB PO DAILY atrial fibrillation 
Nystatin 100,000 UNIT/GRAM CREAM..G.   1 JUAN TOP BID FUNGAL RASH
     TO AFFECTED AREAS 
Omeprazole 40 MG CAPSULE.DR   1 CAP PO QPM ACID REFLUX  (Reported)
Oxycodone HCl/Acetaminophen (Oxycodone-Acetaminophen 5-325) 5 MG-325 MG TABLET  
1-2 TAB PO Q6P PRN PAIN  (Reported)
Polyethylene Glycol 3350 (Miralax) 17 GRAM POWD.PACK   1 PAC PO DAILY 
CONSTIPATION
     dissolve in water
Repaglinide (Prandin) 2 MG TABLET   1 TAB PO TID DIABETES
     .
Simvastatin (Simvastatin*) 40 MG TABLET   1 TAB PO DAILY CHOLESTEROL  (Reported)
 
Triage Note:
RECENTLY D/C'D FROM HERE FOR CHF EXACERBATION AND
 PT STATES SHE GAINED 10 LBS FROM LAST NIGHT. PT IS
 O2 DEPENDENT 2-3LNC. STATES SHE IS UP TO 3 ALL THE
 TIME NOW. PT DENIES CP. BLE EDEMA AND ABDOMINAL
 EDEMA
Triage Nurses Notes Reviewed? yes
Onset: Gradual
Duration: day(s):
Timing: recent history
Severity: moderate
HPI:
82yo female with hx of CHF, COPD on 2L O2, afib on eliquis, DM presents to ED 
complaining of 10lb weight gain over 1 night.  Patient was admitted  for CHF 
exacerbation and was getting IV Lasix 40 mg twice a day.  She was discharged on 
4/10 with instructions to take Lasix 40mg daily.  Patient's daughter states she 
has been giving her mother Lasix 40mg BID because that is the dose schedule she 
was taking prior to hospital admission.  When patient was discharged she weighed
187lbs. Daughter reports a weight of 214lbs this AM.  Patient also has 
increasing dyspnea especially worse with slight exertion, requiring 3L O2.  
Daughter reports increasing swelling around the patient's abdomen and lower 
extremities bilaterally.  The patient denies chest pain, abdominal pain, fevers,
or syncope.
(Cecilia IBARRA,Merary Nolen)
 
Vital Signs & Intake/Output
Vital Signs & Intake/Output
 Vital Signs
 
 
Date Time Temp Pulse Resp B/P B/P Pulse O2 O2 Flow FiO2
 
     Mean Ox Delivery Rate 
 
 1617 97.3 93 22 129/58  96 Nasal 2.0L 
 
       Cannula  
 
 1458       Nasal  
 
       Cannula  
 
 1137 97.5 88 22 122/63  96 Nasal 3.0L 
 
       Cannula  
 
 
 
(Nelsy CALDERON,Alejandro MONTERROSO)
 
Past History
 
Travel History
Traveled to [place] past 21 day No
 
Medical History
Any Pertinent Medical History? see below for history
Neurological: migraine (remote history), NEUROPATHY
EENT: NONE
Cardiovascular: AFIB, angina, CHF, hypertension, hyperlipidemia, "MULTIPLE MI'S
" QUAD BYPASS
Respiratory: COPD
Gastrointestinal: GERD
Hepatic: NONE
Renal: NONE
Musculoskeletal: NONE
Psychiatric: anxiety
Endocrine: diabetes, HYPOTHYROID
Blood Disorders: NONE
Cancer(s): NONE
History of MRSA: No
History of VRE: No
History of CDIFF: No
Influenza Vaccine: 10/01/17
 
Surgical History
Surgical History: appendectomy, arthroscopy, CABG, cholecystectomy, hysterectomy
, thyroid lobectomy rotator cuff repair
 
Psychosocial History
Who do you live with Daughter
Services at Home None
What is your primary language English
Tobacco Use: Never used
ETOH Use: denies use
Illicit Drug Use: denies illicit drug use
 
Family History
Family History, If Any:
FATHER
  FH: CABG (coronary artery bypass surgery)
  FH: diabetes mellitus
  FH: HTN (hypertension)
  FH: kidney disease
  FH: stroke
MOTHER
  FH: myocardial infarction
BROTHER
  FH: heart disease
SISTER
  FH: COPD (chronic obstructive pulmonary disease)
  FH: heart disease
 
Hx Contributory? No
(Merary Coleman)
 
Review of Systems
 
Review of Systems
Constitutional:
Reports: see HPI. 
EENTM:
Reports: no symptoms. 
Respiratory:
Reports: see HPI. 
Cardiovascular:
Reports: see HPI. 
GI:
Reports: see HPI. 
Genitourinary:
Reports: no symptoms. 
Musculoskeletal:
Reports: no symptoms. 
Skin:
Reports: no symptoms. 
Neurological/Psychological:
Reports: no symptoms. 
Hematologic/Endocrine:
Reports: no symptoms. 
Immunologic/Allergic:
Reports: no symptoms. 
All Other Systems: Reviewed and Negative
(Merary Coleman)
 
Physical Exam
 
Physical Exam
General Appearance: well developed/nourished, no apparent distress, alert, awake
Head: atraumatic, normal appearance
Eyes:
Bilateral: normal appearance. 
Ears, Nose, Throat: hearing grossly normal
Neck: normal inspection, supple, full range of motion
Respiratory: no respiratory distress, diminished breath sounds bilaterally
Cardiovascular: irregularly irregular
Peripheral Pulses:
2+ radial (R), 2+ radial (L)
Gastrointestinal: normal bowel sounds, non-tender, no organomegaly, mild 
firmness
Extremities: 2-3+ pitting edema bilaterally
Neurologic/Psych: awake, alert, oriented x 3
Skin: intact, normal color, warm/dry
 
Core Measures
ACS in differential dx? Yes
CVA/TIA Diagnosis No
Sepsis Present: No
Sepsis Focused Exam Completed? No
(Merary Coleman)
 
Progress
Differential Diagnosis: asthma, AMI, bronchitis, costochondritis, CHF, COPD, 
musculoskeletal pain, pulmonary embolism, pneumonia, unstable angina
Diagnostic Imaging:
Viewed by Me: Radiology Read.  Discussed w/RAD: Radiology Read. 
CXR Impression: PATIENT: ENA THOMPSON  MEDICAL RECORD NO: [id number] PRESENT AGE: 
83  PATIENT ACCOUNT NO: [id number] : 37  LOCATION: [facility] ORDERING PHYSICIAN:
Rafael IBARRA     SERVICE DATE:  EXAM TYPE: RAD - XRY-CHEST XRAY, 
TWO VIEWS EXAMINATION: XR CHEST CLINICAL INFORMATION: Weight gain. Dyspnea. 
COMPARISON: Chest x-ray 2018, 2018, 10/31/2017 TECHNIQUE: 2 views of
the chest were obtained. FINDINGS: The heart size is significantly enlarged. 
This is unchanged since prior study. There is vascular calcifications of the 
wall of aorta. Patient's had prior median sternotomy. The central hilar vessels 
are mildly prominent but unchanged since prior chest x-rays. There is no 
interstitial edema or overt pulmonary edema. There is no pleural effusion. There
is no dense consolidation. IMPRESSION: Persistent cardiomegaly and mildly 
prominent central hilar pulmonary vessels unchanged since prior studies without 
overt pulmonary edema. DICTATED BY: Kevin Bolanos MD  DATE/TIME DICTATED:18 :RAD.MEZA  DATE/TIME TRANSCRIBED:18 
CONFIDENTIAL, DO NOT COPY WITHOUT APPROPRIATE AUTHORIZATION.  <Electronically 
signed in Other Vendor System>                                                  
                                     SIGNED BY: Kevin Bolanos MD 18 1500
Initial ED EKG: atrial fibrillation @96bpm, PVCs, RAD, artifact present V4-V5
Prior EKG: unchanged (18)
(Cecilia IBARRA,Merary Nolen)
Plan of Care:
 Orders
 
 
Procedure Date/time Status
 
Heart Healthy Diet  B Active
 
Patient Data  1812 Active
 
Place in observation  1733 Active
 
Misc Message  1733 Active
 
ED Holding Orders  1733 Active
 
Vital Signs  1733 Active
 
Code Status  1733 Active
 
TROPONIN LEVEL  1126 Complete
 
COMPREHENSIVE METABOLIC PANEL  1126 Complete
 
CBC WITHOUT DIFFERENTIAL  1126 Complete
 
B-TYPE NATRIURETIC PEP (BNP)  1126 Complete
 
EKG  1126 Active
 
 
 Current Medications
 
 
  Sig/[place] Start time  Last
 
Medication Dose  Stop Time Status Admin
 
Oxycodone/ See Dose Q6P PRN  1500 UNVr 
 
Acetaminophen Insts (1)    1450
 
(Percocet)     
 
 
Dose Instructions:
(1)Oxycodone/Acetaminophen (Percocet):
        1-2 TAB
 
 Laboratory Tests
 
 
 
18 1254:
Anion Gap 14, Estimated GFR 36  L, BUN/Creatinine Ratio 20.7, Glucose 212  H, 
Calcium 8.8, Total Bilirubin 0.8, AST 18, ALT 21, Alkaline Phosphatase 97, 
Troponin I 0.02, Pro-B-Natriuretic Pept 1730  H, Total Protein 7.0, Albumin 4.0,
Globulin 3.0, Albumin/Globulin Ratio 1.3, CBC w Diff NO MAN DIFF REQ, RBC 3.73  
L, MCV 77.3  L, MCH 24.5  L, MCHC 31.7  L, RDW 23.7  H, MPV 7.6, Gran % 81.2  H,
Lymphocytes % 9.8  L, Monocytes % 6.1, Eosinophils % 2.6, Basophils % 0.3, 
Absolute Granulocytes 7.6  H, Absolute Lymphocytes 0.9  L, Absolute Monocytes 
0.6, Absolute Eosinophils 0.2, Absolute Basophils 0
 
Patient's weight has increased from 187 pounds on 4/10 to 214 pounds today.  
Chest x-ray stable compared to previous imaging however her BNP is trending 
upward compared to prior labs.
 
Awaiting cardiology page.
 
Spoke with Ceasar Avalos MD regarding this patient - he requests a formal 
cardiology consult from house staff.
 
Case management recommend observation.
 
Dr. Whittington spoke with hospitalist regarding this patient's telemetry admission.
(Cecilia IBARRA,Merary Nolen)
Comments:
2018 5:35:33 PM I have discussed this patient's case with Dr. Tamayo.
 
2018 5:50:31 PM I have updated the patient and her family on treatment 
plan.
(Nelsy CALDERON,Alejandro MONTERROSO)
 
Departure
 
Departure
Disposition: STILL A PATIENT
Condition: Stable
Clinical Impression
Primary Impression: CHF exacerbation
Qualifiers:  Heart failure type: unspecified Qualified Code: I50.9 - Heart 
failure, unspecified
Secondary Impressions: Weight gain
Referrals:
Marty Zacarias MD (PCP/Family)
 
Departure Forms:
Customer Survey
General Discharge Information
(Merary Coleman)
 
Observation Note
Spoke With:
Sly Tamayo MD
Physician Advisor Notified: WILMER CALDERON,SYDNEY MEJIA
Place Patient In: Non-ED OBS Care Area
Rationale for Observation:
My rational for observation is as follows patient presents with a significant 
weight gain, past medical history of coronary artery disease and congestive 
heart failure.  Given the patient's weight gain I feel she is at risk of 
congestive heart failure respiratory failure and death.  The patient has been 
treated with oral Lasix and has failed outpatient management.  I feel she now 
requires hospitalization for an aggressive IV diuresis and monitoring of intake 
output and daily weights to assure negative fluid balance.  Cardiology 
consultation should be considered.  Patient's medications should be reviewed and
are my.  Dietary implications should be reviewed as well..
 
 
PA/NP Co-Sign Statement
Statement:
ED Attending supervision documentation-
 
[X] I saw and evaluated the patient. I have also reviewed all the pertinent lab 
results and diagnostic results. I agree with the findings and the plan of care 
as documented in the PA's/NP's documentation.  Patient presents for evaluation 
of a substantial weight gain overnight.  Patient also states that she was having
some increased shortness of breath requiring more oxygen than usual.  Physical 
examination reveals a mildly dyspneic-appearing woman with bilateral lower 
extremity pitting edema.
 
[] I have reviewed the ED Record and agree with the PA's/NP's documentation.
 
[] Additions or exceptions (if any) to the PAs/NP's note and plan are 
summarized below:
[]
 
(Nelsy CALDERON,Alejandro MONTERROSO)
 
Critical Care Note
 
Critical Care Note
Critical Care Time: non-applicable
(Cecilia IBARRA,Merary Nolen)

## 2018-04-13 VITALS — SYSTOLIC BLOOD PRESSURE: 116 MMHG | DIASTOLIC BLOOD PRESSURE: 64 MMHG

## 2018-04-13 VITALS — DIASTOLIC BLOOD PRESSURE: 76 MMHG | SYSTOLIC BLOOD PRESSURE: 112 MMHG

## 2018-04-13 LAB
ABSOLUTE GRANULOCYTE CT: 5.1 /CUMM (ref 1.4–6.5)
BASOPHILS # BLD: 0.1 /CUMM (ref 0–0.2)
BASOPHILS NFR BLD: 0.8 % (ref 0–2)
EOSINOPHIL # BLD: 0.3 /CUMM (ref 0–0.7)
EOSINOPHIL NFR BLD: 3.6 % (ref 0–5)
ERYTHROCYTE [DISTWIDTH] IN BLOOD BY AUTOMATED COUNT: 23 % (ref 11.5–14.5)
GRANULOCYTES NFR BLD: 70.3 % (ref 42.2–75.2)
HCT VFR BLD CALC: 26.5 % (ref 37–47)
LYMPHOCYTES # BLD: 1.1 /CUMM (ref 1.2–3.4)
MCH RBC QN AUTO: 24.9 PG (ref 27–31)
MCHC RBC AUTO-ENTMCNC: 32.2 G/DL (ref 33–37)
MCV RBC AUTO: 77.3 FL (ref 81–99)
MONOCYTES # BLD: 0.7 /CUMM (ref 0.1–0.6)
PLATELET # BLD: 202 /CUMM (ref 130–400)
PMV BLD AUTO: 8.1 FL (ref 7.4–10.4)
RED BLOOD CELL CT: 3.43 /CUMM (ref 4.2–5.4)
WBC # BLD AUTO: 7.3 /CUMM (ref 4.8–10.8)

## 2018-04-13 NOTE — PN- HOUSESTAFF
Emanuel CALDERON,Sathish 18 0713:
Subjective
Follow-up For:
Dyspnea, multifactorial
Weight gain
Tele-Events Since Last Visit:
FERNANDO. john HR [id number]
Subjective:
Patient was seen and examined at bedside.  She is resting comfortably.  She had 
no acute events overnight.  She had a difficult time sleeping, however she 
states the common occurrence.  She reports mild improvement in her lower 
extremity edema.  Her dyspnea is overall improved and she has been titrated down
to her baseline 2 L.  
 
Review of Systems
Constitutional:
Reports: no symptoms. 
EENTM:
Reports: no symptoms. 
Cardiovascular:
Reports: peripheral edema.  Denies: chest pain, palpitations. 
Respiratory:
Reports: short of breath (improving). 
Gastrointestinal:
Reports: no symptoms. 
Genitourinary:
Reports: no symptoms. 
Musculoskeletal:
Reports: no symptoms. 
 
Objective
Last 24 Hrs of Vital Signs/I&O
 Vital Signs
 
 
Date Time Temp Pulse Resp B/P B/P Pulse O2 O2 Flow FiO2
 
     Mean Ox Delivery Rate 
 
 0602 97.7 89 18 112/76  97 Nasal  
 
       Cannula  
 
 0000       Nasal 2.0L 
 
       Cannula  
 
 2322 97.6 102 18 136/64  96 Nasal  
 
       Cannula  
 
 2308      96 Nasal 2.0L 
 
       Cannula  
 
 2222 97.7 101 22 148/68  97 Nasal 2.0L 
 
       Cannula  
 
 1848 97.9 95 20 136/63  96 Nasal 2.0L 
 
       Cannula  
 
 1617 97.3 93 22 129/58  96 Nasal 2.0L 
 
       Cannula  
 
 1458       Nasal  
 
       Cannula  
 
 1137 97.5 88 22 122/63  96 Nasal 3.0L 
 
       Cannula  
 
 
 Intake & Output
 
 
  0800  0000  1600
 
Intake Total 110 240 
 
Output Total 450  
 
Balance -340 240 
 
    
 
Intake, IV 10  
 
Intake, Oral 100 240 
 
Output, Urine 450  
 
Patient  214 lb 214 lb
 
Weight   
 
Weight   Reported by Patient
 
Measurement   
 
Method   
 
 
 
 
Physical Exam
General Appearance: Alert, Oriented X3, Cooperative, No Acute Distress
Skin Temp/Moisture Exam: Warm/Dry
Cardiovascular: Regular Rate, Normal S1, Normal S2, irregularly irregular rhythm
Lungs: minimal crackles at the bases
Abdomen: Normal Bowel Sounds, Soft, No Tenderness
Current Medications:
 Current Medications
 
 
  Sig/[place] Start time  Last
 
Medication Dose Route Stop Time Status Admin
 
Albuterol Sulfate 3 ML Q6-PRN PRN  1845 AC 
 
  INH   
 
Apixaban 2.5 MG BID 2100 AC 
 
  PO   2155
 
Atorvastatin Calcium 20 MG 1700  1700 AC 
 
  PO   
 
Budesonide/ 1 PUF BID  2100 AC 
 
Formoterol Fumarate  INH   
 
Furosemide 40 MG DAILY  0900 AC 
 
  IV   
 
Furosemide 0 .STK-MED ONE  1918 DC 
 
  IV   
 
Furosemide 60 MG ONCE ONE  1730 DC 
 
  IV PUSH  1731  1935
 
Gabapentin 300 MG QPM 2100 AC 
 
  PO   2155
 
Insulin Aspart 0 AT BEDTIME 2100 AC 
 
  SC   
 
Insulin Aspart 0 TIDAC  0800 AC 
 
  SC   
 
Isosorbide  60 MG DAILY  09 AC 
 
Mononitrate  PO   
 
Levothyroxine Sodium 0.1 MG DAILY AC  07 AC 
 
  PO   0527
 
Metoprolol Succinate 25 MG DAILY  0900 AC 
 
  PO   
 
Nystatin 1 JUAN BID  2100 AC 
 
  TOP   
 
Omeprazole 40 MG QPM  2100 AC 
 
  PO   2155
 
Oxycodone/ 2 TAB Q6P PRN  2330 AC 
 
Acetaminophen  PO   0527
 
Oxycodone/ 1 TAB Q6P PRN  2330 AC 
 
Acetaminophen  PO   
 
Oxycodone/ 0 .STK-MED ONE  1759 DC 
 
Acetaminophen  PO   
 
Oxycodone/ 0 .STK-MED ONE  1505 DC 
 
Acetaminophen  PO   
 
Oxycodone/ See Dose Q6P PRN  1500 DC 
 
Acetaminophen Insts (1) PO   1450
 
Oxycodone/ 1 TAB ONCE ONE  1500 DC 
 
Acetaminophen  PO  1501  1501
 
 
Dose Instructions:
(1)Oxycodone/Acetaminophen:
        1-2 TAB
 
 
 
Last 24 Hrs of Lab/En Results
Last 24 Hrs of Labs/Mics:
 Laboratory Tests
 
18 1940:
Troponin I 0.02
 
18 1254:
Anion Gap 14, Estimated GFR 36  L, BUN/Creatinine Ratio 20.7, Glucose 212  H, 
Calcium 8.8, Total Bilirubin 0.8, AST 18, ALT 21, Alkaline Phosphatase 97, 
Troponin I 0.02, Pro-B-Natriuretic Pept 1730  H, Total Protein 7.0, Albumin 4.0,
Globulin 3.0, Albumin/Globulin Ratio 1.3, CBC w Diff NO MAN DIFF REQ, RBC 3.73  
L, MCV 77.3  L, MCH 24.5  L, MCHC 31.7  L, RDW 23.7  H, MPV 7.6, Gran % 81.2  H,
Lymphocytes % 9.8  L, Monocytes % 6.1, Eosinophils % 2.6, Basophils % 0.3, 
Absolute Granulocytes 7.6  H, Absolute Lymphocytes 0.9  L, Absolute Monocytes 
0.6, Absolute Eosinophils 0.2, Absolute Basophils 0
 
 
Assessment/Plan
Assessment:
Patient is an 83-year-old female with a PMH significant for CAD status post CABG
in  with stent placement in , A. fib on Eliquis, hypothyroidism, COPD on
2 L O2 at baseline, who was recently the [facility] for CHF exacerbation 
who comes in to the ED complaining of significant weight gain, and worsening 
dyspnea on exertion.
 
Active problem list
#dyspnea, multifactorial
Patient had elevated proBNP, was not significantly elevated imaging did not show
definitive signs of CHF.  To gather a thorough dietary history from the patient 
or is unlikely that the 10 pound gain shown on the scale was a chair reading.  
Lower extremity swelling likely more likely due to chronic venous insufficiency
-Patient is stable for discharge
-Patient will be given a knee scale through the CHF clinic
-Patient should follow-up with cardiologist Dr. Marcin Lopez within 1 week of
discharge
-Will be discharged on Lasix 40 mg twice a day with metolazone when necessary
 
#Uncontrolled diabetes with hyperglycemia
Fingerstick glucose readings showed some improvement while here
-Patient will be discharged on home insulin regimen, and instructions to follow-
up with her PCP
 
#Chronic medical problems
Continue home medications
 
Diet: Diabetic diet, sodium restricted
DVT prophylaxis: Eliquis, Alps
CODE STATUS: Full code
 
Problem List:
 1. Weight gain
 
 2. Dyspnea
 
Pain Ratin
Pain Location:
none
Pain Goal: Remain pain free
Pain Plan:
pain pathway
Tomorrow's Labs & Rationales:
none
 
 
Bayron Rincon MD 18 1045:
Attending MD Review Statement
 
Attending Statement
Attending MD Statement: examined this patient, discuss w/resident/PA/NP, agreed 
w/resident/PA/NP, reviewed EMR data (avail)
Attending Assessment/Plan:
83F PMH atrial fibrillation, COPD on 2 L nasal cannula, HFpEF, CAD S/P CABG and 
PCI, pulmonary hypertension, DM, history of thyroid tumor S/P radio ablation, 
CKD stage 3, recently discharged after being treated for CHF, went home and 
weighed herself, and was found to have gained 10 pounds in 1 day.  Patient has 
no new symptoms and feels well.  She is breathing comfortably and has trace LE 
edema.  She did not eat any high salt foods.  CXR unchanged, EKG NSR, exam 
benign, labs reviewed.
 
1. Chronic systolic CHF
2. Unexpected weight gain
 
Plan
- Observation on telemetry
- IV diuresis
- Cardiology consult
- Continue home medications
- I/O, daily weights
- DVT PPx
- Anticipated discharge home tomorrow, will follow cardiology recommendations 
for discharge diuretics

## 2018-04-13 NOTE — PATIENT DISCHARGE INSTRUCTIONS
Discharge Instructions
 
General Discharge Information
You were seen/treated for:
Shortness of breath
leg swelling
We believe that your symptoms were multifactorial, and while CHF may have played
a role, based on your chest XRay and lab work it does not appear as though you 
were significantly fluid overloaded.  You should follow-up closely with your 
doctors (cardiology, pulmonology, and primary care).
Special Instructions:
Follow-up with your cardiologist, Dr. Lopez, within 1 week of discharge.
 
Follow-up with your primary care physician within 1 week of discharge.
 
We have provided a prescription for Metolazone, take this once daily as needed 
for worsening lower extreimty swelling. 
 
Acute Coronary Syndrome
 
Inclusion Criteria
At DC or during hospital stay patient has or had the following:
ACS DIAGNOSIS No
 
Discharge Core Measures
Meds if any: Prescribed or Continued at Discharge
Meds if any: NOT Prescribed or Continued at Discharge
 
Congestive Heart Failure
 
Inclusion Criteria
At DC or during hospital stay patient has or had the following:
CHF DIAGNOSIS Yes
 
Discharge Core Measures
Meds if any: Prescribed or Continued at Discharge
Meds if any: NOT Prescribed or Continued at Discharge
 
Cerebrovascular accident
 
Inclusion Criteria
At DC or during hospital stay patient has or had the following:
CVA/TIA Diagnosis No
 
Discharge Core Measures
Meds if any: Prescribed or Continued at Discharge
Meds if any: NOT Prescribed or Continued at Discharge
 
Venous thromboembolism
 
Inclusion Criteria
VTE Diagnosis No
VTE Type NONE
VTE Confirmed by (Test) NONE
 
Discharge Core Measures
- Per Current guidelines, there needs to be overlap
- treatment for the first 5 days of Warfarin therapy.
- If discharged on Warfarin prior to 5 days of
- overlap therapy, the patient will need to be
- assessed for post discharge needs including
- *Post discharge parental anticoagulation
- *Warfarin and/or parental anticoagulation education
- *Follow up date to check INR post discharge
At least 5 days overlap therapy as Inpatient No
Meds if any: Prescribed or Continued at Discharge
Note: Overlap Therapy is Warfarin and Anticoagulant
Meds if any: NOT Prescribed or Continued at Discharge

## 2018-04-13 NOTE — CONS- CARDIOLOGY
General Information and HPI
 
Consulting Request
Date of Consult: 04/13/18
Requested By:
Bayron Rincon MD
 
Reason for Consult:
chf
Source of Information: patient, old records
History of Present Illness:
 
 
This is an 83-year-old female with a past medical history of chronic heart 
failure with normal left ventricular ejection fraction, chronic lower extremity 
edema with chronic venous insufficiency, chronic renal insufficiency, atrial 
fibrillation on Eliquis, coronary artery disease with prior CABG and subsequent 
PCI 2013, COPD with pulmonary hypertension and mild right ventricular 
dysfunction, and obesity who was recently discharged from [facility] for CHF but 
returned due to a reported 10 pound weight gain overnight along with some 
shortness of breath; she does have chronic intermittent shortness of breath 
which has not rapidly been increasing and was not associated with any chest pain
, dyspnea, or palpitations. Denies bleeding. Denies paroxysmal nocturnal 
dyspnea.  Denies subjective fever.
 
 
Allergies/Medications
Allergies:
Coded Allergies:
NO KNOWN ALLERGIES (11/26/13)
 
Home Med List:
Albuterol Sulfate 2.5 MG/3 ML (0.083 %) VIAL.NEB   1 Vial INH/SOL Q6-PRN PRN 
SHORTNESS OF BREATH  (Reported)
Apixaban (Eliquis) 2.5 MG TABLET   2.5 MG PO BID ATRIAL FIBRILLATION
     .
Augmentin (Augmentin 500-125 Tablet) 500 MG-125 MG TABLET   1 TAB PO BID 
CELLULITIS
     .
Ferrous Sulfate 325 MG (65 MG IRON) TABLET   1 TAB PO DAILY IRON, VITAMIN
Fluticasone/Salmeterol (Advair 250-50 Diskus) 250 MCG-50 MCG/DOSE BLST.W.DEV   1
PUF INH BID PRN SHORTNESS OF BREATH  (Reported)
Furosemide (Lasix) 40 MG TABLET   1 TAB PO DAILY WATER RETENTION  (Reported)
Gabapentin 300 MG CAPSULE   1 CAP PO QPM SLEEP  (Reported)
Insulin Detemir (Levemir Flextouch) 100 UNIT/ML (3 ML) INSULN.PEN   34 U SC 
DAILY DIABETES  (Reported)
Isosorbide Mononitrate (Isosorbide Mononitrate ER) 60 MG TAB.ER.24H   1 TAB PO 
DAILY HEART  (Reported)
Levothyroxine Sodium 100 MCG TABLET   1 TAB PO DAILY AC THYROID  (Reported)
Lorazepam 0.5 MG TABLET   1 TAB PO QPMP PRN SLEEP  (Reported)
Metoprolol Succ XL (Toprol XL) 25 MG TAB   1 TAB PO DAILY atrial fibrillation 
Nystatin 100,000 UNIT/GRAM CREAM..G.   1 JUAN TOP BID FUNGAL RASH
     TO AFFECTED AREAS 
Omeprazole 40 MG CAPSULE.   1 CAP PO QPM ACID REFLUX  (Reported)
Oxycodone HCl/Acetaminophen (Oxycodone-Acetaminophen 5-325) 5 MG-325 MG TABLET  
1-2 TAB PO Q6P PRN PAIN  (Reported)
Polyethylene Glycol 3350 (Miralax) 17 GRAM POWD.PACK   1 PAC PO DAILY 
CONSTIPATION
     dissolve in water
Repaglinide (Prandin) 2 MG TABLET   1 TAB PO TID DIABETES
     .
Simvastatin (Simvastatin*) 40 MG TABLET   1 TAB PO DAILY CHOLESTEROL  (Reported)
 
Current Medications:
 Current Medications
 
 
  Sig/Mercedez Start time  Last
 
Medication Dose Route Stop Time Status Admin
 
Albuterol Sulfate 3 ML TID 04/13 1400 AC 
 
  INH   
 
Albuterol Sulfate 3 ML Q6-PRN PRN 04/12 1845 DC 
 
  INH   
 
Apixaban 2.5 MG BID 04/12 2100 AC 04/13
 
  PO   1037
 
Atorvastatin Calcium 20 MG 1700 04/13 1700 AC 
 
  PO   
 
Budesonide/ 1 PUF BID 04/12 2100 AC 04/13
 
Formoterol Fumarate  INH   1044
 
Furosemide 40 MG DAILY 04/13 0900 AC 04/13
 
  IV   1044
 
Furosemide 0 .STK-MED ONE 04/12 1918 DC 
 
  IV   
 
Furosemide 60 MG ONCE ONE 04/12 1730 DC 04/12
 
  IV PUSH 04/12 1731  1935
 
Gabapentin 300 MG QPM 04/12 2100 AC 04/12
 
  PO   2155
 
Insulin Aspart 0 AT BEDTIME 04/13 2100 AC 
 
  SC   
 
Insulin Aspart 0 TIDAC 04/13 0800 AC 
 
  SC   
 
Isosorbide  60 MG DAILY 04/13 0900 AC 04/13
 
Mononitrate  PO   1036
 
Levothyroxine Sodium 0.1 MG DAILY AC 04/13 0700 AC 04/13
 
  PO   0527
 
Metoprolol Succinate 25 MG ONCE ONE 04/13 0915 DC 04/13
 
  PO 04/13 0916  1038
 
Metoprolol Succinate 25 MG DAILY 04/13 0900 AC 04/13
 
  PO   1037
 
Nystatin 1 JUAN TID 04/13 1052 AC 
 
  TOP   
 
Nystatin 1 JUAN BID 04/12 2100 DC 
 
  TOP   
 
Omeprazole 40 MG QPM 04/12 2100 AC 04/12
 
  PO   2155
 
Oxycodone/ 2 TAB Q6P PRN 04/12 2330 AC 04/13
 
Acetaminophen  PO   1156
 
Oxycodone/ 1 TAB Q6P PRN 04/12 2330 AC 
 
Acetaminophen  PO   
 
Oxycodone/ 0 .STK-MED ONE 04/12 1759 DC 
 
Acetaminophen  PO   
 
Oxycodone/ 0 .STK-MED ONE 04/12 1505 DC 
 
Acetaminophen  PO   
 
Oxycodone/ See Dose Q6P PRN 04/12 1500 DC 04/12
 
Acetaminophen Insts (1) PO   1450
 
Oxycodone/ 1 TAB ONCE ONE 04/12 1500 DC 04/12
 
Acetaminophen  PO 04/12 1501  1501
 
 
Dose Instructions:
(1)Oxycodone/Acetaminophen:
        1-2 TAB
 
 
 
Review of Systems
Review of Systems:
Review of systems as per HPI. The remainder of a 10 point review of systems was 
reviewed and was otherwise negative.
 
Past History
 
Travel History
Traveled to [place] past 21 day No
 
Medical History
Blood Transfusion Hx: No
Neurological: migraine (remote history), NEUROPATHY
EENT: NONE
Cardiovascular: AFIB, angina, CHF, hypertension, hyperlipidemia, "MULTIPLE MI'S
" QUAD BYPASS
Respiratory: COPD
Gastrointestinal: GERD
Hepatic: NONE
Renal: NONE
Musculoskeletal: NONE
Psychiatric: anxiety
Endocrine: diabetes, HYPOTHYROID
Blood Disorders: NONE
Cancer(s): NONE
GYN/Reproductive: NONE
 
Surgical History
Surgical History: appendectomy, arthroscopy, CABG, cholecystectomy, hysterectomy
, thyroid lobectomy rotator cuff repair
 
Family History
Relations & Conditions If Any:
FATHER
  FH: CABG (coronary artery bypass surgery)
  FH: diabetes mellitus
  FH: HTN (hypertension)
  FH: kidney disease
  FH: stroke
MOTHER
  FH: myocardial infarction
BROTHER
  FH: heart disease
SISTER
  FH: COPD (chronic obstructive pulmonary disease)
  FH: heart disease
 
 
Psychosocial History
Where Do You Live? Home
Who Do You Live With? child
Services at Home: Nursing
Primary Language: English
Smoking Status: Never Smoked (significant 2nd hand exposure)
ETOH Use: denies use
Illicit Drug Use: denies illicit drug use
Living Will? yes
 
Functional Ability
Ambulation: walker
 
Exam & Diagnostic Data
Vital Signs and I&O
Vital Signs
 
 
Date Time Temp Pulse Resp B/P B/P Pulse O2 O2 Flow FiO2
 
     Mean Ox Delivery Rate 
 
04/13 1038  78  132/68     
 
04/13 1037  78  132/68     
 
04/13 1036  78  132/68     
 
04/13 0920       Nasal 2.0L 
 
       Cannula  
 
04/13 0602 97.7 89 18 112/76  97 Nasal  
 
       Cannula  
 
04/13 0000       Nasal 2.0L 
 
       Cannula  
 
04/12 2322 97.6 102 18 136/64  96 Nasal  
 
       Cannula  
 
04/12 2308      96 Nasal 2.0L 
 
       Cannula  
 
04/12 2222 97.7 101 22 148/68  97 Nasal 2.0L 
 
       Cannula  
 
04/12 1848 97.9 95 20 136/63  96 Nasal 2.0L 
 
       Cannula  
 
04/12 1617 97.3 93 22 129/58  96 Nasal 2.0L 
 
       Cannula  
 
04/12 1458       Nasal  
 
       Cannula  
 
 
 Intake & Output
 
 
 04/13 1600 04/13 0800 04/13 0000 04/12 1600 04/12 0800 04/12 0000
 
Intake Total  110 240   
 
Output Total  450    
 
Balance  -340 240   
 
       
 
Intake, IV  10    
 
Intake, Oral  100 240   
 
Output, Urine  450    
 
Patient   214 lb 214 lb  
 
Weight      
 
Weight    Reported by Patient  
 
Measurement      
 
Method      
 
 
 
Physical Exam:
General: no apparent distress. Alert.
Eyes: No obvious scleral icterus.
HEENT: No jugular venous distention or abnormal jugular venous pulsations.
Cardiovascular: Normal intensity S1/S2.  Irregular
Respiratory: Lungs clear to auscultation bilaterally.
Abdomen: Soft, nontender with no guarding or rebound tenderness.
Musculoskeletal: No clubbing or cyanosis noted; 1+ lower extremity edema
Skin: Warm
Neurologic: No gross focal deficits noted.
 
Labs/En Results:
 Laboratory Tests
 
 
 04/13 04/12
 
 0645 1940
 
Chemistry  
 
  Sodium (137 - 145 mmol/L) 136  L 
 
  Potassium (3.5 - 5.1 mmol/L) 3.7 
 
  Chloride (98 - 107 mmol/L) 93  L 
 
  Carbon Dioxide (22 - 30 mmol/L) 31  H 
 
  Anion Gap (5 - 16) 12 
 
  BUN (7 - 17 mg/dL) 27  H 
 
  Creatinine (0.5 - 1.0 mg/dL) 1.3  H 
 
  Estimated GFR (>60 ml/min) 39  L 
 
  BUN/Creatinine Ratio (7 - 25 %) 20.8 
 
  Troponin I (< 0.11 ng/ml)  0.02
 
Hematology  
 
  CBC w Diff NO MAN DIFF REQ 
 
  WBC (4.8 - 10.8 /CUMM) 7.3 
 
  RBC (4.20 - 5.40 /CUMM) 3.43  L 
 
  Hgb (12.0 - 16.0 G/DL) 8.5  L 
 
  Hct (37 - 47 %) 26.5  L 
 
  MCV (81.0 - 99.0 FL) 77.3  L 
 
  MCH (27.0 - 31.0 PG) 24.9  L 
 
  MCHC (33.0 - 37.0 G/DL) 32.2  L 
 
  RDW (11.5 - 14.5 %) 23.0  H 
 
  Plt Count (130 - 400 /CUMM) 202 
 
  MPV (7.4 - 10.4 FL) 8.1 
 
  Gran % (42.2 - 75.2 %) 70.3 
 
  Lymphocytes % (20.5 - 51.1 %) 15.1  L 
 
  Monocytes % (1.7 - 9.3 %) 10.2  H 
 
  Eosinophils % (0 - 5 %) 3.6 
 
  Basophils % (0.0 - 2.0 %) 0.8 
 
  Absolute Granulocytes (1.4 - 6.5 /CUMM) 5.1 
 
  Absolute Lymphocytes (1.2 - 3.4 /CUMM) 1.1  L 
 
  Absolute Monocytes (0.10 - 0.60 /CUMM) 0.7  H 
 
  Absolute Eosinophils (0.0 - 0.7 /CUMM) 0.3 
 
  Absolute Basophils (0.0 - 0.2 /CUMM) 0.1 
 
 
 
 
 04/12
 
 1254
 
Chemistry 
 
  Sodium (137 - 145 mmol/L) 137
 
  Potassium (3.5 - 5.1 mmol/L) 4.3
 
  Chloride (98 - 107 mmol/L) 91  L
 
  Carbon Dioxide (22 - 30 mmol/L) 31  H
 
  Anion Gap (5 - 16) 14
 
  BUN (7 - 17 mg/dL) 29  H
 
  Creatinine (0.5 - 1.0 mg/dL) 1.4  H
 
  Estimated GFR (>60 ml/min) 36  L
 
  BUN/Creatinine Ratio (7 - 25 %) 20.7
 
  Glucose (65 - 99 mg/dL) 212  H
 
  Calcium (8.4 - 10.2 mg/dL) 8.8
 
  Total Bilirubin (0.2 - 1.3 mg/dL) 0.8
 
  AST (14 - 36 U/L) 18
 
  ALT (9 - 52 U/L) 21
 
  Alkaline Phosphatase (<127 U/L) 97
 
  Troponin I (< 0.11 ng/ml) 0.02
 
  Pro-B-Natriuretic Pept (<125 pg/mL) 1730  H
 
  Total Protein (6.3 - 8.2 g/dL) 7.0
 
  Albumin (3.5 - 5.0 g/dL) 4.0
 
  Globulin (1.9 - 4.2 gm/dL) 3.0
 
  Albumin/Globulin Ratio (1.1 - 2.2 %) 1.3
 
Hematology 
 
  CBC w Diff NO MAN DIFF REQ
 
  WBC (4.8 - 10.8 /CUMM) 9.4
 
  RBC (4.20 - 5.40 /CUMM) 3.73  L
 
  Hgb (12.0 - 16.0 G/DL) 9.1  L
 
  Hct (37 - 47 %) 28.8  L
 
  MCV (81.0 - 99.0 FL) 77.3  L
 
  MCH (27.0 - 31.0 PG) 24.5  L
 
  MCHC (33.0 - 37.0 G/DL) 31.7  L
 
  RDW (11.5 - 14.5 %) 23.7  H
 
  Plt Count (130 - 400 /CUMM) 260
 
  MPV (7.4 - 10.4 FL) 7.6
 
  Gran % (42.2 - 75.2 %) 81.2  H
 
  Lymphocytes % (20.5 - 51.1 %) 9.8  L
 
  Monocytes % (1.7 - 9.3 %) 6.1
 
  Eosinophils % (0 - 5 %) 2.6
 
  Basophils % (0.0 - 2.0 %) 0.3
 
  Absolute Granulocytes (1.4 - 6.5 /CUMM) 7.6  H
 
  Absolute Lymphocytes (1.2 - 3.4 /CUMM) 0.9  L
 
  Absolute Monocytes (0.10 - 0.60 /CUMM) 0.6
 
  Absolute Eosinophils (0.0 - 0.7 /CUMM) 0.2
 
  Absolute Basophils (0.0 - 0.2 /CUMM) 0
 
 
 
 
Diagnostic Data
EKG Results
Tracing was personally reviewed and shows atrial fibrillation 97 bpm with poor R
-wave progression
CXR Results
Persistent cardiomegaly and mildly prominent central hilar pulmonary vessels
unchanged since prior studies without overt pulmonary edema.
Other Results
Telemetry tracings are personally reviewed and showed atrial fibrillation with 
controlled ventricular response rate
 
Assessment/Plan
Assessment/Plan
 
 
1.  Chronic heart failure with preserved LV ejection fraction 
2.  Lower extremity edema with chronic venous insufficiency
3.  Chronic renal insufficiency
4.  Anemia
5.  Atrial fibrillation on chronic anticoagulation with Eliquis
6.  Coronary artery disease with prior CABG and subsequent PCI 2013
7.  COPD 
8.  Pulmonary hypertension with mild RV dysfunction
9.  Obesity
 
 
At this time I am not convinced the patient has recurrent CHF decompensation; 
her BNP is not very elevated and her chest x-ray shows no overt pulmonary edema.
 I think it is unlikely that she gained 10 pounds of fluid within 1 night and in
the past it seems her weight fluctuations have not correlated well with her 
fluid status. She does have lower extremity edema but a large component of this 
is due to chronic venous insufficiency. Her heart rate remains well controlled. 
She does have chronic intermittent shortness of breath which is likely 
multifactorial in the setting of COPD with pulmonary hypertension, CHF, anemia, 
and obesity.  At this time I do not think she needs further IV Lasix and can try
increasing Lasix to 40 mg p.o. twice daily with the plan for potentially adding 
Zaroxolyn as an outpatient on a PRN basis in the future if required.  She should
follow-up in our office within 1 week of discharge. Please do not hesitate to 
contact me with any additional questions or concerns.
 
 
Salomón Avalos MD [facility]
 
 
 
 
Consult Acknowledgment
- Thank you for your consult request.

## 2018-08-23 ENCOUNTER — HOSPITAL ENCOUNTER (OUTPATIENT)
Dept: HOSPITAL 68 - STS | Age: 83
End: 2018-08-23
Payer: COMMERCIAL

## 2018-08-23 VITALS — BODY MASS INDEX: 38.51 KG/M2 | HEIGHT: 61 IN | WEIGHT: 204 LBS

## 2018-08-23 DIAGNOSIS — Z79.01: ICD-10-CM

## 2018-08-23 DIAGNOSIS — H25.9: Primary | ICD-10-CM

## 2018-08-23 DIAGNOSIS — Z79.84: ICD-10-CM

## 2018-08-23 DIAGNOSIS — Z99.81: ICD-10-CM

## 2018-08-23 DIAGNOSIS — J44.9: ICD-10-CM

## 2018-08-23 DIAGNOSIS — E11.9: ICD-10-CM

## 2018-08-23 DIAGNOSIS — I10: ICD-10-CM

## 2018-08-23 DIAGNOSIS — I25.2: ICD-10-CM

## 2018-08-23 PROCEDURE — V2632 POST CHMBR INTRAOCULAR LENS: HCPCS
